# Patient Record
Sex: FEMALE | Race: BLACK OR AFRICAN AMERICAN | NOT HISPANIC OR LATINO | Employment: FULL TIME | URBAN - METROPOLITAN AREA
[De-identification: names, ages, dates, MRNs, and addresses within clinical notes are randomized per-mention and may not be internally consistent; named-entity substitution may affect disease eponyms.]

---

## 2017-01-26 ENCOUNTER — TELEPHONE (OUTPATIENT)
Dept: FAMILY MEDICINE | Facility: CLINIC | Age: 52
End: 2017-01-26

## 2017-02-13 DIAGNOSIS — I10 ESSENTIAL HYPERTENSION WITH GOAL BLOOD PRESSURE LESS THAN 140/90: ICD-10-CM

## 2017-02-13 NOTE — TELEPHONE ENCOUNTER
Losartan 50MG Tablets      Last Written Prescription Date: 8/22/16  Last Fill Quantity: 90, # refills: 1  Last Office Visit with [facility], [facility] or [facility] prescribing provider: 2/29/16       Potassium   Date Value Ref Range Status   02/29/2016 4.0 3.4 - 5.3 mmol/L Final     Creatinine   Date Value Ref Range Status   02/29/2016 0.68 0.52 - 1.04 mg/dL Final   09/11/2007 0.60 0.60 - 1.30 mg/dL Final       Amlodipine Besylate 5MG Tablets      Last Written Prescription Date: 8/22/16  Last Fill Quantity: 90, # refills: 1    Last Office Visit with [facility], [facility] or [facility] prescribing provider:  2/29/16   Future Office Visit:        BP Readings from Last 3 Encounters:   08/22/16 142/82   08/26/16 132/80   02/29/16 140/84       BP Readings from Last 3 Encounters:   08/22/16 142/82   08/26/16 132/80   02/29/16 140/84

## 2017-02-14 RX ORDER — LOSARTAN POTASSIUM 50 MG/1
50 TABLET ORAL DAILY
Qty: 30 TABLET | Refills: 0 | Status: SHIPPED | OUTPATIENT
Start: 2017-02-14 | End: 2017-03-21

## 2017-02-14 RX ORDER — AMLODIPINE BESYLATE 5 MG/1
5 TABLET ORAL DAILY
Qty: 30 TABLET | Refills: 0 | Status: SHIPPED | OUTPATIENT
Start: 2017-02-14 | End: 2017-04-24

## 2017-02-14 NOTE — TELEPHONE ENCOUNTER
Medication is being filled for 1 time refill only due to:  Patient's last office visit was 2/29/16.     Dayana Amin RN  [facility]

## 2017-03-07 NOTE — TELEPHONE ENCOUNTER
3/7/2017    Call Regarding Onboarding [employer]    Attempt 2    Message on voicemail     Comments: dep--- spouse  4 child and 1 adult      Outreach   cnt

## 2017-03-21 DIAGNOSIS — I10 ESSENTIAL HYPERTENSION WITH GOAL BLOOD PRESSURE LESS THAN 140/90: ICD-10-CM

## 2017-03-21 NOTE — TELEPHONE ENCOUNTER
Losartan 50mg tabs      Last Written Prescription Date: 2/14/17  Last Fill Quantity: 30, # refills: 0  [place] [facility] [facility] or [facility] prescribing provider: 2/29/16       Potassium   Date Value Ref Range Status   02/29/2016 4.0 3.4 - 5.3 mmol/L Final     Creatinine   Date Value Ref Range Status   02/29/2016 0.68 0.52 - 1.04 mg/dL Final   09/11/2007 0.60 0.60 - 1.30 mg/dL Final     BP Readings from Last 3 Encounters:   08/22/16 142/82   08/26/16 132/80   02/29/16 140/84

## 2017-03-21 NOTE — TELEPHONE ENCOUNTER
Left VM for pt to return call to clinic    Pt needs to be informed she is past due for appt.     Kallie Yun RN

## 2017-03-30 RX ORDER — LOSARTAN POTASSIUM 50 MG/1
50 TABLET ORAL DAILY
Qty: 30 TABLET | Refills: 0 | Status: SHIPPED | OUTPATIENT
Start: 2017-03-30 | End: 2017-04-24

## 2017-03-30 NOTE — TELEPHONE ENCOUNTER
Pt has appointment scheduled for physical with Dr. Alcaraz at the  clinic. Forwarding to  refills for FYI. Pt will need filled at her appointment.    MEEK AlvaresN, RN  [facility]

## 2017-03-30 NOTE — TELEPHONE ENCOUNTER
Routing back to [place].  This patient is not a [facility] patient yet.  She has never been seen at [facility].  Looks like she may run out of meds before seeing [facility].    Maria T Leone RN

## 2017-04-24 ENCOUNTER — OFFICE VISIT (OUTPATIENT)
Dept: FAMILY MEDICINE | Facility: CLINIC | Age: 52
End: 2017-04-24
Payer: COMMERCIAL

## 2017-04-24 VITALS
HEART RATE: 77 BPM | RESPIRATION RATE: 14 BRPM | SYSTOLIC BLOOD PRESSURE: 138 MMHG | OXYGEN SATURATION: 98 % | DIASTOLIC BLOOD PRESSURE: 81 MMHG | TEMPERATURE: 99 F | BODY MASS INDEX: 32.1 KG/M2 | HEIGHT: 67 IN | WEIGHT: 204.5 LBS

## 2017-04-24 DIAGNOSIS — J06.9 UPPER RESPIRATORY TRACT INFECTION, UNSPECIFIED TYPE: ICD-10-CM

## 2017-04-24 DIAGNOSIS — Z13.220 LIPID SCREENING: ICD-10-CM

## 2017-04-24 DIAGNOSIS — E66.9 OBESITY, UNSPECIFIED OBESITY SEVERITY, UNSPECIFIED OBESITY TYPE: ICD-10-CM

## 2017-04-24 DIAGNOSIS — I10 ESSENTIAL HYPERTENSION: ICD-10-CM

## 2017-04-24 DIAGNOSIS — Z00.00 ENCOUNTER FOR ROUTINE ADULT HEALTH EXAMINATION WITHOUT ABNORMAL FINDINGS: Primary | ICD-10-CM

## 2017-04-24 DIAGNOSIS — Z11.59 NEED FOR HEPATITIS C SCREENING TEST: ICD-10-CM

## 2017-04-24 PROCEDURE — 99213 OFFICE O/P EST LOW 20 MIN: CPT | Mod: 25 | Performed by: FAMILY MEDICINE

## 2017-04-24 PROCEDURE — 99396 PREV VISIT EST AGE 40-64: CPT | Performed by: FAMILY MEDICINE

## 2017-04-24 RX ORDER — AMLODIPINE BESYLATE 5 MG/1
5 TABLET ORAL DAILY
Qty: 90 TABLET | Refills: 3 | Status: SHIPPED | OUTPATIENT
Start: 2017-04-24 | End: 2018-05-03

## 2017-04-24 RX ORDER — LOSARTAN POTASSIUM 50 MG/1
50 TABLET ORAL DAILY
Qty: 90 TABLET | Refills: 3 | Status: SHIPPED | OUTPATIENT
Start: 2017-04-24 | End: 2018-05-09

## 2017-04-24 NOTE — MR AVS SNAPSHOT
After Visit Summary   4/24/2017    Aubree Yao    MRN: [id number]           Patient Information     Date Of Birth          1965        Visit Information        Provider Department      4/24/2017 3:20 PM Radha Alcaraz MD [facility] [place]        Today's Diagnoses     Encounter for routine adult health examination without abnormal findings    -  1    Need for hepatitis C screening test        Essential hypertension        Lipid screening        Obesity, unspecified obesity severity, unspecified obesity type          Care Instructions     Try robotussin DM (dextramethorphan) or coricidin for your cough day or night. Try mucinex (guaifenesin) for your congestion day or night. Ibuprofen/aleve can be used in the short term, but can raise your blood pressure a little bit. Tylenol 1000mg three times a day is okay as well for fever or discomfort.    See me in one week.       Preventive Health Recommendations  Female Ages 50 - 64    Yearly exam: See your health care provider every year in order to  o Review health changes.   o Discuss preventive care.    o Review your medicines if your doctor has prescribed any.      Get a Pap test every three years (unless you have an abnormal result and your provider advises testing more often).    If you get Pap tests with HPV test, you only need to test every 5 years, unless you have an abnormal result.     You do not need a Pap test if your uterus was removed (hysterectomy) and you have not had cancer.    You should be tested each year for STDs (sexually transmitted diseases) if you're at risk.     Have a mammogram every 1 to 2 years.    Have a colonoscopy at age 50, or have a yearly FIT test (stool test). These exams screen for colon cancer.      Have a cholesterol test every 5 years, or more often if advised.    Have a diabetes test (fasting glucose) every three years. If you are at risk for diabetes, you should have this test more often.      If you are at risk for osteoporosis (brittle bone disease), think about having a bone density scan (DEXA).    Shots: Get a flu shot each year. Get a tetanus shot every 10 years.    Nutrition:     Eat at least 5 servings of fruits and vegetables each day.    Eat whole-grain bread, whole-wheat pasta and brown rice instead of white grains and rice.    Talk to your provider about Calcium and Vitamin D.     Lifestyle    Exercise at least 150 minutes a week (30 minutes a day, 5 days a week). This will help you control your weight and prevent disease.    Limit alcohol to one drink per day.    No smoking.     Wear sunscreen to prevent skin cancer.     See your dentist every six months for an exam and cleaning.    See your eye doctor every 1 to 2 years.          Follow-ups after your visit        Additional Services     BARIATRIC ADULT REFERRAL       Your provider has referred you to: [facility] [facility] [phone]   [email or web address]  [facility]: Lifestyle Medicine Program for Weight Management [facility] [phone]   [email or web address]  [facility]: [facility] [place] [phone]   [email or web address]    Please be aware that coverage of these services is subject to the terms and limitations of your health insurance plan.  Call member services at your health plan with any benefit or coverage questions.      Please bring the following with you to your appointment:      (1) List of current medications   (2) This referral request   (3) Any documents/labs given to you for this referral                  Future tests that were ordered for you today     Open Future Orders        Priority Expected Expires Ordered    Hepatitis C Screen Reflex to HCV RNA Quant and Genotype Routine  4/24/2018 4/24/2017     "Basic metabolic panel Routine  2018    Lipid panel reflex to direct LDL Routine  2018            Who to contact     If you have questions or need follow up information about today's clinic visit or your schedule please contact [facility] directly at [phone].  Normal or non-critical lab and imaging results will be communicated to you by MyChart, letter or phone within 4 business days after the clinic has received the results. If you do not hear from us within 7 days, please contact the clinic through [employer] or phone. If you have a critical or abnormal lab result, we will notify you by phone as soon as possible.  Submit refill requests through [employer] or call your pharmacy and they will forward the refill request to us. Please allow 3 business days for your refill to be completed.          Additional Information About Your Visit        [employer] Information     [employer] lets you send messages to your doctor, view your test results, renew your prescriptions, schedule appointments and more. To sign up, go to [email or web address] . Click on \"Log in\" on the left side of the screen, which will take you to the Welcome page. Then click on \"Sign up Now\" on the right side of the page.     You will be asked to enter the access code listed below, as well as some personal information. Please follow the directions to create your username and password.     Your access code is: [email or web address]  Expires: 2017  4:05 PM     Your access code will  in 90 days. If you need help or a new code, please call your [place] clinic or [phone].        Care EveryWhere ID     This is your Care EveryWhere ID. This could be used by other organizations to access your [place] medical records  [id number]        Your Vitals Were     Pulse Temperature Respirations Height Last Period Pulse Oximetry    77 99  F (37.2  C) (Oral) 14 5' 7\" (1.702 m) 2017 98%    BMI (Body Mass Index)       "             32.03 kg/m2            Blood Pressure from Last 3 Encounters:   04/24/17 (!) 139/100   08/22/16 142/82   08/26/16 132/80    Weight from Last 3 Encounters:   04/24/17 204 lb 8 oz (92.8 kg)   02/29/16 198 lb (89.8 kg)   08/05/15 196 lb 6.4 oz (89.1 kg)              We Performed the Following     BARIATRIC ADULT REFERRAL          Where to get your medicines      These medications were sent to [facility] [address] [place] [address] [address] [place] [address] [address] [address]     Phone:  [phone]     amLODIPine 5 MG tablet    losartan 50 MG tablet          Primary Care Provider Office Phone # Fax #    Cathryn Craft PA-C [phone]       [facility] [address]   [facility] [address]        Thank you!     Thank you for choosing [facility] [place]  for your care. Our goal is always to provide you with excellent care. Hearing back from our patients is one way we can continue to improve our services. Please take a few minutes to complete the written survey that you may receive in the mail after your visit with us. Thank you!             Your Updated Medication List - Protect others around you: Learn how to safely use, store and throw away your medicines at www.disposemymeds.org.          This list is accurate as of: 4/24/17  4:05 PM.  Always use your most recent med list.                   Brand Name Dispense Instructions for use    amLODIPine 5 MG tablet    NORVASC    90 tablet    Take 1 tablet (5 mg) by mouth daily Please schedule an office visit prior to further refills. [phone]       losartan 50 MG tablet    COZAAR    90 tablet    Take 1 tablet (50 mg) by mouth daily Please schedule an office visit prior to further refills [phone]

## 2017-04-24 NOTE — NURSING NOTE
"Chief Complaint   Patient presents with     Physical       Initial BP (!) 139/100  Pulse 77  Temp 99  F (37.2  C) (Oral)  Resp 14  Ht 5' 7\" (1.702 m)  Wt 204 lb 8 oz (92.8 kg)  LMP 04/03/2017  SpO2 98%  BMI 32.03 kg/m2 Estimated body mass index is 32.03 kg/(m^2) as calculated from the following:    Height as of this encounter: 5' 7\" (1.702 m).    Weight as of this encounter: 204 lb 8 oz (92.8 kg).  Medication Reconciliation: complete     Crystal Timmons MA     "

## 2017-04-24 NOTE — PROGRESS NOTES
SUBJECTIVE:     CC: Aubree Yao is an 51 year old woman who presents for preventive health visit.     Physical   Annual:     Getting at least 3 servings of Calcium per day::  Yes    Bi-annual eye exam::  Yes    Dental care twice a year::  Yes    Sleep apnea or symptoms of sleep apnea::  None    Diet::  Regular (no restrictions)    Frequency of exercise::  1 day/week    Duration of exercise::  Less than 15 minutes    Taking medications regularly::  Yes    Additional concerns today::  No    Cold: Aubree has had cold symptoms since last Tuesday. It seems like the cold is getting worse, especially the pressure on the sides of her face. She has been coughing a lot which has caused her back and stomach to hurt. She has been taking antihistamines, decongestants, which helped relieve pressure. She also took Lady-seltzer day and night.    Today's PHQ-2 Score:   PHQ-2 ( 1999 Pfizer) 4/24/2017   Q1: Little interest or pleasure in doing things -   Q2: Feeling down, depressed or hopeless -   PHQ-2 Score -   Little interest or pleasure in doing things Not at all   Feeling down, depressed or hopeless Not at all   PHQ-2 Score 0     Abuse: Current or Past(Physical, Sexual or Emotional)- No  Do you feel safe in your environment - Yes    Social History   Substance Use Topics     Smoking status: Never Smoker     Smokeless tobacco: Not on file     Alcohol use No     Recent Labs   Lab Test  02/29/16   1036  04/22/13   1003   CHOL  155  177   HDL  36*  36*   LDL  110*  126   TRIG  46  72   CHOLHDLRATIO   --   4.9   NHDL  119   --      Reviewed orders with patient.  Reviewed health maintenance and updated orders accordingly - Yes    Mammo Decision Support:  Patient over age 50, mutual decision to screen reflected in health maintenance.    Pertinent mammograms are reviewed under the imaging tab.    History of abnormal Pap smear:   NO - age 30-65 PAP every 5 years with negative HPV co-testing recommended    Last 3 Pap  Results:   PAP (no units)   Date Value   2014 NIL   2011 NIL       Reviewed and updated as needed this visit by clinical staff  Tobacco  Allergies  Meds  Med Hx  Surg Hx  Fam Hx  Soc Hx        Reviewed and updated as needed this visit by Provider        Past Medical History:   Diagnosis Date     Hypertension       History reviewed. No pertinent surgical history.  Obstetric History     No data available        ROS:  10 point ROS is negative except as noted above.    BP Readings from Last 3 Encounters:   17 (!) 139/100   16 142/82   16 132/80    Wt Readings from Last 3 Encounters:   17 92.8 kg (204 lb 8 oz)   16 89.8 kg (198 lb)   08/05/15 89.1 kg (196 lb 6.4 oz)        Patient Active Problem List   Diagnosis     Essential hypertension     Preeclampsia     S/P      Low back pain     Abnormal mammogram     Non morbid obesity due to excess calories     History reviewed. No pertinent surgical history.    Social History   Substance Use Topics     Smoking status: Never Smoker     Smokeless tobacco: Not on file     Alcohol use No     Family History   Problem Relation Age of Onset     CEREBROVASCULAR DISEASE Mother      from non treated HTN - alive     Hypertension Mother      Didn't manage with meds     Hypertension Paternal Grandfather          Current Outpatient Prescriptions   Medication Sig Dispense Refill     losartan (COZAAR) 50 MG tablet Take 1 tablet (50 mg) by mouth daily Please schedule an office visit prior to further refills [phone] 30 tablet 0     amLODIPine (NORVASC) 5 MG tablet Take 1 tablet (5 mg) by mouth daily Please schedule an office visit prior to further refills. [phone] 30 tablet 0     No Known Allergies  Recent Labs   Lab Test  16   1037  16   1036  10/21/14   0859  14   0930   13   1003   10/10/10   0420   LDL   --   110*   --    --    --   126   --    --    HDL   --   36*   --    --    --   36*   --    --   "  TRIG   --   46   --    --    --   72   --    --    ALT  20   --    --    --    --   25   --   13   CR  0.68   --   0.9  0.77   < >  0.83   < >  0.80   GFRESTIMATED  >90  Non  GFR Calc     --    --   79   < >  74   < >  78   GFRESTBLACK  >90   GFR Calc     --    --   >90   GFR Calc     < >  89   < >  >90   POTASSIUM  4.0   --   4.1  3.2*   < >  3.9   < >  3.1*   TSH   --   0.61   --   0.70   --    --    < >   --     < > = values in this interval not displayed.      This document serves as a record of the services and decisions personally performed and made by Radha Alcaraz MD. It was created on her behalf by Melody Davis, a trained medical scribe. The creation of this document is based on the provider's statements to the medical scribe.  Melody Davis April 24, 2017 3:41 PM    OBJECTIVE:     BP (!) 139/100  Pulse 77  Temp 99  F (37.2  C) (Oral)  Resp 14  Ht 1.702 m (5' 7\")  Wt 92.8 kg (204 lb 8 oz)  LMP 04/03/2017  SpO2 98%  BMI 32.03 kg/m2  EXAM:  GENERAL: healthy, alert and no distress  EYES: Eyes grossly normal to inspection, PERRL and conjunctivae and sclerae normal  HENT: ear canals and TM's normal, nose and mouth without ulcers or lesions  NECK: no adenopathy, no asymmetry, masses, or scars and thyroid normal to palpation  RESP: lungs clear to auscultation - no rales, rhonchi or wheezes  CV: sounds mildly tachy on exam, with occasional irregular beats, normal S1 S2, no S3 or S4, no murmur, click or rub  ABDOMEN: soft, nontender, no hepatosplenomegaly, no masses and bowel sounds normal  MS: no gross musculoskeletal defects noted, no edema  SKIN: no suspicious lesions or rashes  NEURO: Normal strength and tone, mentation intact and speech normal  PSYCH: mentation appears normal, affect normal/bright  LYMPH: no cervical, supraclavicular, axillary, or inguinal adenopathy    ASSESSMENT/PLAN:     1. Encounter for routine adult health examination without " abnormal findings  Last colonoscopy 7/2016. Last mammogram 11/2016.    2. Need for hepatitis C screening test  - Hepatitis C Screen Reflex to HCV RNA Quant and Genotype; Future    3. Essential hypertension  Uncontrolled today, likely secondary to decongestant use. She will follow up with me in one week.  - losartan (COZAAR) 50 MG tablet; Take 1 tablet (50 mg) by mouth daily Please schedule an office visit prior to further refills [phone]  Dispense: 90 tablet; Refill: 3  - amLODIPine (NORVASC) 5 MG tablet; Take 1 tablet (5 mg) by mouth daily Please schedule an office visit prior to further refills. [phone]  Dispense: 90 tablet; Refill: 3  - Basic metabolic panel; Future    4. Lipid screening  Not fasting today. Will check at next visit.  - Lipid panel reflex to direct LDL; Future    5. Obesity, unspecified obesity severity, unspecified obesity type  Looking for suggestions outside of diet and exercise changes on her own.  - BARIATRIC ADULT REFERRAL    6. URI  See pt instructions. Follow up in one week.    Patient Instructions    Try robotussin DM (dextramethorphan) or coricidin for your cough day or night. Try mucinex (guaifenesin) for your congestion day or night. Ibuprofen/aleve can be used in the short term, but can raise your blood pressure a little bit. Tylenol 1000mg three times a day is okay as well for fever or discomfort.    See me in one week.       Preventive Health Recommendations  Female Ages 50 - 64    Yearly exam: See your health care provider every year in order to  o Review health changes.   o Discuss preventive care.    o Review your medicines if your doctor has prescribed any.      Get a Pap test every three years (unless you have an abnormal result and your provider advises testing more often).    If you get Pap tests with HPV test, you only need to test every 5 years, unless you have an abnormal result.     You do not need a Pap test if your uterus was removed (hysterectomy) and you  "have not had cancer.    You should be tested each year for STDs (sexually transmitted diseases) if you're at risk.     Have a mammogram every 1 to 2 years.    Have a colonoscopy at age 50, or have a yearly FIT test (stool test). These exams screen for colon cancer.      Have a cholesterol test every 5 years, or more often if advised.    Have a diabetes test (fasting glucose) every three years. If you are at risk for diabetes, you should have this test more often.     If you are at risk for osteoporosis (brittle bone disease), think about having a bone density scan (DEXA).    Shots: Get a flu shot each year. Get a tetanus shot every 10 years.    Nutrition:     Eat at least 5 servings of fruits and vegetables each day.    Eat whole-grain bread, whole-wheat pasta and brown rice instead of white grains and rice.    Talk to your provider about Calcium and Vitamin D.     Lifestyle    Exercise at least 150 minutes a week (30 minutes a day, 5 days a week). This will help you control your weight and prevent disease.    Limit alcohol to one drink per day.    No smoking.     Wear sunscreen to prevent skin cancer.     See your dentist every six months for an exam and cleaning.    See your eye doctor every 1 to 2 years.        COUNSELING:  Reviewed preventive health counseling, as reflected in patient instructions    BP Screening:   Last 3 BP Readings:    BP Readings from Last 3 Encounters:   04/24/17 (!) 139/100   08/22/16 142/82   08/26/16 132/80     The following was recommended to the patient:  Re-screen within 4 weeks and recommend lifestyle modifications     reports that she has never smoked. She does not have any smokeless tobacco history on file.    Estimated body mass index is 32.03 kg/(m^2) as calculated from the following:    Height as of this encounter: 1.702 m (5' 7\").    Weight as of this encounter: 92.8 kg (204 lb 8 oz).   Weight management plan: Patient referred to endocrine and/or weight management " specialty    Counseling Resources:  ATP IV Guidelines  Pooled Cohorts Equation Calculator  Breast Cancer Risk Calculator  FRAX Risk Assessment  ICSI Preventive Guidelines  Dietary Guidelines for Americans, 2010  USDA's MyPlate  ASA Prophylaxis  Lung CA Screening    The information in this document, created by the medical scribe for me, accurately reflects the services I personally performed and the decisions made by me. I have reviewed and approved this document for accuracy prior to leaving the patient care area.  4/24/2017 3:40 PM      Radha Alcaraz MD, MD  [facility]  Answers for HPI/ROS submitted by the patient on 4/24/2017   Q1: Little interest or pleasure in doing things: 0=Not at all  Q2: Feeling down, depressed or hopeless: 0=Not at all  PHQ-2 Score: 0

## 2017-04-24 NOTE — PATIENT INSTRUCTIONS
Try robotussin DM (dextramethorphan) or coricidin for your cough day or night. Try mucinex (guaifenesin) for your congestion day or night. Ibuprofen/aleve can be used in the short term, but can raise your blood pressure a little bit. Tylenol 1000mg three times a day is okay as well for fever or discomfort.    See me in one week.       Preventive Health Recommendations  Female Ages 50 - 64    Yearly exam: See your health care provider every year in order to  o Review health changes.   o Discuss preventive care.    o Review your medicines if your doctor has prescribed any.      Get a Pap test every three years (unless you have an abnormal result and your provider advises testing more often).    If you get Pap tests with HPV test, you only need to test every 5 years, unless you have an abnormal result.     You do not need a Pap test if your uterus was removed (hysterectomy) and you have not had cancer.    You should be tested each year for STDs (sexually transmitted diseases) if you're at risk.     Have a mammogram every 1 to 2 years.    Have a colonoscopy at age 50, or have a yearly FIT test (stool test). These exams screen for colon cancer.      Have a cholesterol test every 5 years, or more often if advised.    Have a diabetes test (fasting glucose) every three years. If you are at risk for diabetes, you should have this test more often.     If you are at risk for osteoporosis (brittle bone disease), think about having a bone density scan (DEXA).    Shots: Get a flu shot each year. Get a tetanus shot every 10 years.    Nutrition:     Eat at least 5 servings of fruits and vegetables each day.    Eat whole-grain bread, whole-wheat pasta and brown rice instead of white grains and rice.    Talk to your provider about Calcium and Vitamin D.     Lifestyle    Exercise at least 150 minutes a week (30 minutes a day, 5 days a week). This will help you control your weight and prevent disease.    Limit alcohol to one drink per  day.    No smoking.     Wear sunscreen to prevent skin cancer.     See your dentist every six months for an exam and cleaning.    See your eye doctor every 1 to 2 years.

## 2017-05-02 DIAGNOSIS — Z11.59 NEED FOR HEPATITIS C SCREENING TEST: ICD-10-CM

## 2017-05-02 DIAGNOSIS — Z13.220 LIPID SCREENING: ICD-10-CM

## 2017-05-02 DIAGNOSIS — I10 ESSENTIAL HYPERTENSION: ICD-10-CM

## 2017-05-02 PROCEDURE — 86803 HEPATITIS C AB TEST: CPT | Performed by: FAMILY MEDICINE

## 2017-05-02 PROCEDURE — 36415 COLL VENOUS BLD VENIPUNCTURE: CPT | Performed by: FAMILY MEDICINE

## 2017-05-02 PROCEDURE — 80061 LIPID PANEL: CPT | Performed by: FAMILY MEDICINE

## 2017-05-02 PROCEDURE — 80048 BASIC METABOLIC PNL TOTAL CA: CPT | Performed by: FAMILY MEDICINE

## 2017-05-02 NOTE — LETTER
[facility]   [address] [place] [address]   [id number]  [phone]    May 9, 2017      Aubree Mirlande Wang Maximilian  [address] [facility] [address]              Dear Ms. Felipe Yao,      The results of your recent lipid (cholesterol) profile were stable.    Here are the results:  Lab Results       Component                Value               Date                       CHOL                     151                 05/02/2017            Lab Results       Component                Value               Date                       HDL                      31                  05/02/2017            Lab Results       Component                Value               Date                       LDL                      104                 05/02/2017            Lab Results       Component                Value               Date                       TRIG                     81                  05/02/2017            Lab Results       Component                Value               Date                       CHOLHDLRATIO             4.9                 04/22/2013              Desired or goal levels are:  CHOLESTEROL: Desirable is less than 200.   HDL (Good Cholesterol): Desirable is greater than 40 (for men) greater than 50 (for women).  LDL (Bad Cholesterol): Desirable is less than 130 (or less than 100 if you have heart disease or diabetes). Borderline 130-160.  TRIGLYCERIDES: Desirable is less than 150.  Borderline is 150-200.    Your HDL (the good cholesterol) level is low, no medication is required at this point. Reducing your total fat intake, increasing exercise level, reducing weight, adding olive oil to your diet, etc, may help to increase this level..    As you may know, an elevated cholesterol is one factor that increases your risk for heart disease and stroke. You can improve your cholesterol by controlling the amount and type of fat you eat and by increasing your daily activity level.    Here  are some ways to improve your nutrition:  Eat less fat (especially butter, Crisco and other saturated fats)  Buy lean cuts of meat, reduce your portions of red meat or substitute poultry or fish  Use skim milk and low-fat dairy products  Eat no more than 4 egg yolks per week  Avoid fried or fast foods that are high in fat  Eat more fruits and vegetables      Also consider starting or increasing your aerobic activity. Aerobic activity is the best way to improve HDL (good) cholesterol. If this would be new to you, please talk with me first about what activities are safe for you.      Other lab results:    Your hepatitis C test was normal.    The testing of your blood sugar, kidney function, and electrolytes was normal.       Please feel free to contact us with any questions or if you would like more information.    Results for orders placed or performed in visit on 05/02/17   Hepatitis C Screen Reflex to HCV RNA Quant and Genotype   Result Value Ref Range    Hepatitis C Antibody  NR     Nonreactive   Assay performance characteristics have not been established for newborns,   infants, and children     Basic metabolic panel   Result Value Ref Range    Sodium 140 133 - 144 mmol/L    Potassium 3.9 3.4 - 5.3 mmol/L    Chloride 106 94 - 109 mmol/L    Carbon Dioxide 23 20 - 32 mmol/L    Anion Gap 11 3 - 14 mmol/L    Glucose 76 70 - 99 mg/dL    Urea Nitrogen 10 7 - 30 mg/dL    Creatinine 0.88 0.52 - 1.04 mg/dL    GFR Estimate 68 >60 mL/min/1.7m2    GFR Estimate If Black 82 >60 mL/min/1.7m2    Calcium 9.2 8.5 - 10.1 mg/dL   Lipid panel reflex to direct LDL   Result Value Ref Range    Cholesterol 151 <200 mg/dL    Triglycerides 81 <150 mg/dL    HDL Cholesterol 31 (L) >49 mg/dL    LDL Cholesterol Calculated 104 (H) <100 mg/dL    Non HDL Cholesterol 120 <130 mg/dL         Sincerely,    Radha Alcaraz MD/nr

## 2017-05-03 LAB
ANION GAP SERPL CALCULATED.3IONS-SCNC: 11 MMOL/L (ref 3–14)
BUN SERPL-MCNC: 10 MG/DL (ref 7–30)
CALCIUM SERPL-MCNC: 9.2 MG/DL (ref 8.5–10.1)
CHLORIDE SERPL-SCNC: 106 MMOL/L (ref 94–109)
CHOLEST SERPL-MCNC: 151 MG/DL
CO2 SERPL-SCNC: 23 MMOL/L (ref 20–32)
CREAT SERPL-MCNC: 0.88 MG/DL (ref 0.52–1.04)
GFR SERPL CREATININE-BSD FRML MDRD: 68 ML/MIN/1.7M2
GLUCOSE SERPL-MCNC: 76 MG/DL (ref 70–99)
HCV AB SERPL QL IA: NORMAL
HDLC SERPL-MCNC: 31 MG/DL
LDLC SERPL CALC-MCNC: 104 MG/DL
NONHDLC SERPL-MCNC: 120 MG/DL
POTASSIUM SERPL-SCNC: 3.9 MMOL/L (ref 3.4–5.3)
SODIUM SERPL-SCNC: 140 MMOL/L (ref 133–144)
TRIGL SERPL-MCNC: 81 MG/DL

## 2017-05-08 NOTE — PROGRESS NOTES
The results of your recent lipid (cholesterol) profile were stable.    Here are the results:  Lab Results       Component                Value               Date                       CHOL                     151                 05/02/2017            Lab Results       Component                Value               Date                       HDL                      31                  05/02/2017            Lab Results       Component                Value               Date                       LDL                      104                 05/02/2017            Lab Results       Component                Value               Date                       TRIG                     81                  05/02/2017            Lab Results       Component                Value               Date                       CHOLHDLRTAMMIO             4.9                 04/22/2013              Desired or goal levels are:  CHOLESTEROL: Desirable is less than 200.   HDL (Good Cholesterol): Desirable is greater than 40 (for men) greater than 50 (for women).  LDL (Bad Cholesterol): Desirable is less than 130 (or less than 100 if you have heart disease or diabetes). Borderline 130-160.  TRIGLYCERIDES: Desirable is less than 150.  Borderline is 150-200.    Your HDL (the good cholesterol) level is low, no medication is required at this point. Reducing your total fat intake, increasing exercise level, reducing weight, adding olive oil to your diet, etc, may help to increase this level..    As you may know, an elevated cholesterol is one factor that increases your risk for heart disease and stroke. You can improve your cholesterol by controlling the amount and type of fat you eat and by increasing your daily activity level.    Here are some ways to improve your nutrition:  Eat less fat (especially butter, Crisco and other saturated fats)  Buy lean cuts of meat, reduce your portions of red meat or substitute poultry or fish  Use skim milk and low-fat  dairy products  Eat no more than 4 egg yolks per week  Avoid fried or fast foods that are high in fat  Eat more fruits and vegetables      Also consider starting or increasing your aerobic activity. Aerobic activity is the best way to improve HDL (good) cholesterol. If this would be new to you, please talk with me first about what activities are safe for you.      Other lab results:    Your hepatitis C test was normal.    The testing of your blood sugar, kidney function, and electrolytes was normal.       Please feel free to contact us with any questions or if you would like more information.

## 2017-11-13 ENCOUNTER — RADIANT APPOINTMENT (OUTPATIENT)
Dept: MAMMOGRAPHY | Facility: CLINIC | Age: 52
End: 2017-11-13
Attending: PHYSICIAN ASSISTANT
Payer: COMMERCIAL

## 2017-11-13 DIAGNOSIS — Z12.31 VISIT FOR SCREENING MAMMOGRAM: ICD-10-CM

## 2017-11-13 PROCEDURE — G0202 SCR MAMMO BI INCL CAD: HCPCS

## 2017-11-24 ENCOUNTER — ALLIED HEALTH/NURSE VISIT (OUTPATIENT)
Dept: NURSING | Facility: CLINIC | Age: 52
End: 2017-11-24
Payer: COMMERCIAL

## 2017-11-24 DIAGNOSIS — Z23 NEED FOR PROPHYLACTIC VACCINATION AND INOCULATION AGAINST INFLUENZA: Primary | ICD-10-CM

## 2017-11-24 PROCEDURE — 90686 IIV4 VACC NO PRSV 0.5 ML IM: CPT

## 2017-11-24 PROCEDURE — 99207 ZZC NO CHARGE NURSE ONLY: CPT

## 2017-11-24 PROCEDURE — 90471 IMMUNIZATION ADMIN: CPT

## 2017-11-24 NOTE — MR AVS SNAPSHOT
"              After Visit Summary   11/24/2017    Aubree Yao    MRN: [id number]           Patient Information     Date Of Birth          1965        Visit Information        Provider Department      11/24/2017 10:15 AM  FLU CLINIC NURSE [facility]        Today's Diagnoses     Need for prophylactic vaccination and inoculation against influenza    -  1       Follow-ups after your visit        Your next 10 appointments already scheduled     Nov 24, 2017 10:15 AM CST   Nurse Only with  FLU CLINIC NURSE   [facility] ([facility])    [address] [place] [address] [phone]              Who to contact     If you have questions or need follow up information about today's clinic visit or your schedule please contact [facility] directly at [phone].  Normal or non-critical lab and imaging results will be communicated to you by MOgenehart, letter or phone within 4 business days after the clinic has received the results. If you do not hear from us within 7 days, please contact the clinic through MOgenehart or phone. If you have a critical or abnormal lab result, we will notify you by phone as soon as possible.  Submit refill requests through Duetto or call your pharmacy and they will forward the refill request to us. Please allow 3 business days for your refill to be completed.          Additional Information About Your Visit        MyChart Information     Duetto lets you send messages to your doctor, view your test results, renew your prescriptions, schedule appointments and more. To sign up, go to [email or web address] . Click on \"Log in\" on the left side of the screen, which will take you to the Welcome page. Then click on \"Sign up Now\" on the right side of the page.     You will be asked to enter the access code listed below, as well as some personal information. Please follow the directions to create your " username and password.     Your access code is: [email or web address]  Expires: 2018  9:10 AM     Your access code will  in 90 days. If you need help or a new code, please call your [place] clinic or [phone].        Care EveryWhere ID     This is your Care EveryWhere ID. This could be used by other organizations to access your [place] medical records  [id number]         Blood Pressure from Last 3 Encounters:   17 138/81   16 142/82   16 132/80    Weight from Last 3 Encounters:   17 204 lb 8 oz (92.8 kg)   16 198 lb (89.8 kg)   08/05/15 196 lb 6.4 oz (89.1 kg)              We Performed the Following     FLU VAC, SPLIT VIRUS IM > 3 YO (QUADRIVALENT) [82763]     Vaccine Administration, Initial [22217]        Primary Care Provider Office Phone # Fax #    Cathryn Craft PA-C [phone]       [address] [place] [address]        Equal Access to Services     [facility]: Hadii aad ku hadasho Sotianali, waaxda luqadaha, qaybta kaalmada adeegyada, prince barnett . So [facility] [phone].    ATENCIÓN: Si habla español, tiene a perez disposición servicios gratuitos de asistencia lingüística. Llame al [phone].    We comply with applicable federal civil rights laws and [place] laws. We do not discriminate on the basis of race, color, national origin, age, disability, sex, sexual orientation, or gender identity.            Thank you!     Thank you for choosing [facility]  for your care. Our goal is always to provide you with excellent care. Hearing back from our patients is one way we can continue to improve our services. Please take a few minutes to complete the written survey that you may receive in the mail after your visit with us. Thank you!             Your Updated Medication List - Protect others around you: Learn how to safely use, store and throw away your medicines at www.disposemymeds.org.           This list is accurate as of: 11/24/17  9:10 AM.  Always use your most recent med list.                   Brand Name Dispense Instructions for use Diagnosis    amLODIPine 5 MG tablet    NORVASC    90 tablet    Take 1 tablet (5 mg) by mouth daily Please schedule an office visit prior to further refills. [phone]    Essential hypertension       losartan 50 MG tablet    COZAAR    90 tablet    Take 1 tablet (50 mg) by mouth daily Please schedule an office visit prior to further refills [phone]    Essential hypertension

## 2017-11-24 NOTE — PROGRESS NOTES

## 2018-03-20 ENCOUNTER — TELEPHONE (OUTPATIENT)
Dept: OTHER | Facility: CLINIC | Age: 53
End: 2018-03-20

## 2018-03-20 NOTE — TELEPHONE ENCOUNTER
3/20/2018    Call Regarding Onboarding [employer] plus other    Attempt 1    Message on voicemail    Comments:       Outreach   [facility]

## 2018-04-09 NOTE — TELEPHONE ENCOUNTER
4/9/2018    Call Regarding Onboarding [employer] [place] [employer] Other    Attempt 2    Message on voicemail     Comments: spouse, 4 child dep      Outreach   [facility]

## 2018-04-26 NOTE — TELEPHONE ENCOUNTER
4/26/2018    Call Regarding Onboarding [employer]    Attempt 3    Message     Comments: patient phone was having some difficulties she couldn't hear me.      Outreach   [facility]

## 2018-05-03 DIAGNOSIS — I10 ESSENTIAL HYPERTENSION: ICD-10-CM

## 2018-05-04 NOTE — TELEPHONE ENCOUNTER
"Requested Prescriptions   Pending Prescriptions Disp Refills     amLODIPine (NORVASC) 5 MG tablet [Pharmacy Med Name: AMLODIPINE BESYLATE 5MG TABLETS]  Last Written Prescription Date:  4/24/2017  Last Fill Quantity: 90 tabs,  # refills: 3   Last office visit: 4/24/2017 with prescribing provider:  House   Future Office Visit:     90 tablet 0     Sig: TAKE 1 TABLET BY MOUTH DAILY.    Calcium Channel Blockers Protocol  Failed    5/3/2018  7:29 PM       Failed - Blood pressure under 140/90 in past 12 months    BP Readings from Last 3 Encounters:   04/24/17 138/81   08/22/16 142/82   08/26/16 132/80                Failed - Recent (12 mo) or future (30 days) visit within the authorizing provider's specialty    Patient had office visit in the last 12 months or has a visit in the next 30 days with authorizing provider or within the authorizing provider's specialty.  See \"Patient Info\" tab in inbasket, or \"Choose Columns\" in Meds & Orders section of the refill encounter.           Failed - Normal serum creatinine on file in past 12 months    Recent Labs   Lab Test  05/02/17   1526   CR  0.88            Passed - Patient is age 18 or older       Passed - No active pregnancy on record       Passed - No positive pregnancy test in past 12 months          "

## 2018-05-08 RX ORDER — AMLODIPINE BESYLATE 5 MG/1
TABLET ORAL
Qty: 30 TABLET | Refills: 0 | Status: SHIPPED | OUTPATIENT
Start: 2018-05-08 | End: 2018-06-22

## 2018-05-08 NOTE — TELEPHONE ENCOUNTER
Please call patient to schedule an office visit.  Patient has not been seen in clinic for greater than 1 year.  1 month refill sent.  Kavita Monge RN

## 2018-06-22 ENCOUNTER — OFFICE VISIT (OUTPATIENT)
Dept: FAMILY MEDICINE | Facility: CLINIC | Age: 53
End: 2018-06-22
Payer: COMMERCIAL

## 2018-06-22 VITALS
TEMPERATURE: 97.3 F | RESPIRATION RATE: 14 BRPM | OXYGEN SATURATION: 96 % | DIASTOLIC BLOOD PRESSURE: 116 MMHG | HEART RATE: 76 BPM | HEIGHT: 68 IN | SYSTOLIC BLOOD PRESSURE: 158 MMHG | WEIGHT: 209 LBS | BODY MASS INDEX: 31.67 KG/M2

## 2018-06-22 DIAGNOSIS — E78.5 HYPERLIPIDEMIA, UNSPECIFIED HYPERLIPIDEMIA TYPE: ICD-10-CM

## 2018-06-22 DIAGNOSIS — Z00.00 ROUTINE GENERAL MEDICAL EXAMINATION AT A HEALTH CARE FACILITY: Primary | ICD-10-CM

## 2018-06-22 DIAGNOSIS — I10 ESSENTIAL HYPERTENSION: ICD-10-CM

## 2018-06-22 PROCEDURE — 36415 COLL VENOUS BLD VENIPUNCTURE: CPT | Performed by: FAMILY MEDICINE

## 2018-06-22 PROCEDURE — 80048 BASIC METABOLIC PNL TOTAL CA: CPT | Performed by: FAMILY MEDICINE

## 2018-06-22 PROCEDURE — 99213 OFFICE O/P EST LOW 20 MIN: CPT | Mod: 25 | Performed by: FAMILY MEDICINE

## 2018-06-22 PROCEDURE — 82043 UR ALBUMIN QUANTITATIVE: CPT | Performed by: FAMILY MEDICINE

## 2018-06-22 PROCEDURE — 80061 LIPID PANEL: CPT | Performed by: FAMILY MEDICINE

## 2018-06-22 PROCEDURE — 99396 PREV VISIT EST AGE 40-64: CPT | Performed by: FAMILY MEDICINE

## 2018-06-22 RX ORDER — LOSARTAN POTASSIUM 50 MG/1
TABLET ORAL
Qty: 90 TABLET | Refills: 0 | Status: SHIPPED | OUTPATIENT
Start: 2018-06-22 | End: 2018-08-17

## 2018-06-22 RX ORDER — AMLODIPINE BESYLATE 5 MG/1
TABLET ORAL
Qty: 90 TABLET | Refills: 0 | Status: SHIPPED | OUTPATIENT
Start: 2018-06-22 | End: 2018-08-17

## 2018-06-22 NOTE — MR AVS SNAPSHOT
After Visit Summary   6/22/2018    Aubree Yao    MRN: [id number]           Patient Information     Date Of Birth          1965        Visit Information        Provider Department      6/22/2018 4:00 PM Raymundo Centeno MD [facility]        Today's Diagnoses     Routine general medical examination at a health care facility    -  1    Essential hypertension        Hyperlipidemia, unspecified hyperlipidemia type          Care Instructions      Preventive Health Recommendations  Female Ages 50 - 64    Yearly exam: See your health care provider every year in order to  o Review health changes.   o Discuss preventive care.    o Review your medicines if your doctor has prescribed any.      Get a Pap test every three years (unless you have an abnormal result and your provider advises testing more often).    If you get Pap tests with HPV test, you only need to test every 5 years, unless you have an abnormal result.     You do not need a Pap test if your uterus was removed (hysterectomy) and you have not had cancer.    You should be tested each year for STDs (sexually transmitted diseases) if you're at risk.     Have a mammogram every 1 to 2 years.    Have a colonoscopy at age 50, or have a yearly FIT test (stool test). These exams screen for colon cancer.      Have a cholesterol test every 5 years, or more often if advised.    Have a diabetes test (fasting glucose) every three years. If you are at risk for diabetes, you should have this test more often.     If you are at risk for osteoporosis (brittle bone disease), think about having a bone density scan (DEXA).    Shots: Get a flu shot each year. Get a tetanus shot every 10 years.    Nutrition:     Eat at least 5 servings of fruits and vegetables each day.    Eat whole-grain bread, whole-wheat pasta and brown rice instead of white grains and rice.    Talk to your provider about Calcium and Vitamin D.  "    Lifestyle    Exercise at least 150 minutes a week (30 minutes a day, 5 days a week). This will help you control your weight and prevent disease.    Limit alcohol to one drink per day.    No smoking.     Wear sunscreen to prevent skin cancer.     See your dentist every six months for an exam and cleaning.    See your eye doctor every 1 to 2 years.            Follow-ups after your visit        Your next 10 appointments already scheduled     Jul 23, 2018  5:00 PM CDT   Nurse Only with HW MEDICAL ASSISTANT   [facility] ([facility])    [address] [place] [address] [phone]              Who to contact     If you have questions or need follow up information about today's clinic visit or your schedule please contact [facility] directly at [phone].  Normal or non-critical lab and imaging results will be communicated to you by MyChart, letter or phone within 4 business days after the clinic has received the results. If you do not hear from us within 7 days, please contact the clinic through MyChart or phone. If you have a critical or abnormal lab result, we will notify you by phone as soon as possible.  Submit refill requests through Indochino or call your pharmacy and they will forward the refill request to us. Please allow 3 business days for your refill to be completed.          Additional Information About Your Visit        Care EveryWhere ID     This is your Care EveryWhere ID. This could be used by other organizations to access your [facility] medical records  [id number]        Your Vitals Were     Pulse Temperature Respirations Height Last Period Pulse Oximetry    76 97.3  F (36.3  C) (Tympanic) 14 5' 8\" (1.727 m) 06/14/2018 96%    BMI (Body Mass Index)                   31.78 kg/m2            Blood Pressure from Last 3 Encounters:   06/22/18 (!) 158/116   04/24/17 138/81   08/22/16 142/82    Weight from Last 3 Encounters:   06/22/18 209 lb " (94.8 kg)   04/24/17 204 lb 8 oz (92.8 kg)   02/29/16 198 lb (89.8 kg)              We Performed the Following     Albumin Random Urine Quantitative with Creat Ratio     Basic metabolic panel     Lipid Profile (Chol, Trig, HDL, LDL calc)     OFFICE/OUTPT VISIT,EST,LEVL III          Where to get your medicines      These medications were sent to [facility] [address] [place] [address] AT [facility] [address] [address]     Phone:  [phone]     amLODIPine 5 MG tablet    losartan 50 MG tablet          Primary Care Provider Office Phone # Fax #    Cathryn Craft PA-C [phone]       [address] [address]        Equal Access to Services     LON LIZMAA : Hadii marissa trotter hadasho Soomaali, waaxda luqadaha, qaybta kaalmada adeegyada, prince barnett . So [facility] [phone].    ATENCIÓN: Si habla español, tiene a perez disposición servicios gratuitos de asistencia lingüística. Albin al [phone].    We comply with applicable federal civil rights laws and [place] laws. We do not discriminate on the basis of race, color, national origin, age, disability, sex, sexual orientation, or gender identity.            Thank you!     Thank you for choosing [facility]  for your care. Our goal is always to provide you with excellent care. Hearing back from our patients is one way we can continue to improve our services. Please take a few minutes to complete the written survey that you may receive in the mail after your visit with us. Thank you!             Your Updated Medication List - Protect others around you: Learn how to safely use, store and throw away your medicines at www.disposemymeds.org.          This list is accurate as of 6/22/18 11:59 PM.  Always use your most recent med list.                   Brand Name Dispense Instructions for use Diagnosis    amLODIPine 5 MG  tablet    NORVASC    90 tablet    TAKE 1 TABLET BY MOUTH DAILY.    Essential hypertension       losartan 50 MG tablet    COZAAR    90 tablet    TAKE 1 TABLET BY MOUTH DAILY.    Essential hypertension

## 2018-06-22 NOTE — LETTER
June 27, 2018      Aubree Mirlande Yao  [address] [facility] [address]        Dear Ms.Glenn Yao,    We are writing to inform you of your test results.    Here are your results for your recent labs.     Your labs show that you have a protein called albumin in your urine. What this means is that when your kidneys are working normally, they prevent protein from leaking into the urine. You are already on the correct medication for this which is your Losartan. This medication helps decrease the amount of protein in the urine and can prevent or slow the progression of kidney disease. We will continue to monitor your labs yearly.    Based on your cholesterol results, you are a candidate for cholesterol lowering medication. Can you please follow up in clinic to further discuss medication options.   You can also improve your cholesterol by controlling the amount and type of fat you eat and by increasing your daily activity level.    Here are some ways to improve your nutrition:  Eat less fat (especially butter, Crisco and other saturated fats)  Buy lean cuts of meat; reduce your portions of red meat or substitute poultry or fish  Use skim milk and low-fat dairy products  Eat no more than 4 egg yolks per week  Avoid fried or fast foods that are high in fat  Eat more fruits and vegetables    Also consider starting or increasing your aerobic activity; this is the best way to improve HDL (good) cholesterol. If aerobic activity would be new to you, please talk with me first about what activities are safe for you.    Please call or message me if you have questions or concerns.     Resulted Orders   Basic metabolic panel   Result Value Ref Range    Sodium 140 133 - 144 mmol/L    Potassium 3.7 3.4 - 5.3 mmol/L    Chloride 106 94 - 109 mmol/L    Carbon Dioxide 23 20 - 32 mmol/L    Anion Gap 11 3 - 14 mmol/L    Glucose 81 70 - 99 mg/dL      Comment:      Non Fasting    Urea Nitrogen 10 7 - 30 mg/dL     Creatinine 0.83 0.52 - 1.04 mg/dL    GFR Estimate 72 >60 mL/min/1.7m2      Comment:      Non  GFR Calc    GFR Estimate If Black 87 >60 mL/min/1.7m2      Comment:       GFR Calc    Calcium 9.5 8.5 - 10.1 mg/dL   Albumin Random Urine Quantitative with Creat Ratio   Result Value Ref Range    Creatinine Urine 240 mg/dL    Albumin Urine mg/L 1380 mg/L    Albumin Urine mg/g Cr 575.00 (H) 0 - 25 mg/g Cr   Lipid Profile (Chol, Trig, HDL, LDL calc)   Result Value Ref Range    Cholesterol 163 <200 mg/dL    Triglycerides 92 <150 mg/dL      Comment:      Non Fasting    HDL Cholesterol 32 (L) >49 mg/dL    LDL Cholesterol Calculated 113 (H) <100 mg/dL      Comment:      Above desirable:  100-129 mg/dl  Borderline High:  130-159 mg/dL  High:             160-189 mg/dL  Very high:       >189 mg/dl      Non HDL Cholesterol 131 (H) <130 mg/dL      Comment:      Above Desirable:  130-159 mg/dl  Borderline high:  160-189 mg/dl  High:             190-219 mg/dl  Very high:       >219 mg/dl         If you have any questions or concerns, please call the clinic at the number listed above.       Sincerely,        Raymundo Centeno MD/nr

## 2018-06-22 NOTE — PROGRESS NOTES
SUBJECTIVE:   CC: Aubree Yao is an 52 year old woman who presents for preventive health visit.     Physical   Annual:     Getting at least 3 servings of Calcium per day::  Yes    Bi-annual eye exam::  Yes    Dental care twice a year::  Yes    Sleep apnea or symptoms of sleep apnea::  None    Frequency of exercise::  1 day/week    Duration of exercise::  15-30 minutes    Taking medications regularly::  Yes    Medication side effects::  None    Additional concerns today::  No            Pt ran out of her Norvasc last week. She saved one and took one today. She has still been taking Losartan 50 mg.     Today's PHQ-2 Score:   PHQ-2 ( 1999 Pfizer) 6/22/2018   Q1: Little interest or pleasure in doing things 0   Q2: Feeling down, depressed or hopeless 0   PHQ-2 Score 0   Q1: Little interest or pleasure in doing things Not at all   Q2: Feeling down, depressed or hopeless Not at all   PHQ-2 Score 0       Abuse: Current or Past(Physical, Sexual or Emotional)- No  Do you feel safe in your environment - Yes    Social History   Substance Use Topics     Smoking status: Never Smoker     Smokeless tobacco: Not on file     Alcohol use No     Alcohol Use 6/22/2018   If you drink alcohol do you typically have greater than 3 drinks per day OR greater than 7 drinks per week? Not Applicable   No flowsheet data found.    Reviewed orders with patient.  Reviewed health maintenance and updated orders accordingly - Yes    Patient over age 50, mutual decision to screen reflected in health maintenance.    Pertinent mammograms are reviewed under the imaging tab.  History of abnormal Pap smear: NO - age 30-65 PAP every 5 years with negative HPV co-testing recommended    Reviewed and updated as needed this visit by clinical staff         Reviewed and updated as needed this visit by Provider            Review of Systems  CONSTITUTIONAL: NEGATIVE for fever, chills, change in weight  INTEGUMENTARU/SKIN: NEGATIVE for worrisome  "rashes, moles or lesions  EYES: NEGATIVE for vision changes or irritation  ENT: NEGATIVE for ear, mouth and throat problems  RESP: NEGATIVE for significant cough or SOB  BREAST: NEGATIVE for masses, tenderness or discharge  CV: NEGATIVE for chest pain, palpitations or peripheral edema  GI: NEGATIVE for nausea, abdominal pain, heartburn, or change in bowel habits  : + vaginal dryness and not improving with OTC lubrication  Pt is still getting regular and Mom went into menopause at age 60   MUSCULOSKELETAL: + pelvic pain after childbirth, she has 7 children, pain is intermittent, she takes Advil or Aleve PRN and improves, pain lasts for a couple of days   NEURO: NEGATIVE for weakness, dizziness or paresthesias  PSYCHIATRIC: NEGATIVE for changes in mood or affect     OBJECTIVE:   Physical Exam   BP (!) 140/109  Pulse 76  Temp 97.3  F (36.3  C) (Tympanic)  Resp 14  Ht 5' 8\" (1.727 m)  Wt 209 lb (94.8 kg)  LMP 06/14/2018  SpO2 96%  BMI 31.78 kg/m2  GENERAL: healthy, alert and no distress  EYES: Eyes grossly normal to inspection, PERRL and conjunctivae and sclerae normal  HENT: nose and mouth without ulcers or lesions  NECK: no adenopathy, no asymmetry, masses, or scars and thyroid normal to palpation  RESP: lungs clear to auscultation - no rales, rhonchi or wheezes  CV: regular rate and rhythm, normal S1 S2  ABDOMEN: soft, nontender, no hepatosplenomegaly, no masses and bowel sounds normal  MS: no gross musculoskeletal defects noted, no edema  SKIN: no suspicious lesions or rashes  NEURO: Normal strength and tone, mentation intact and speech normal  PSYCH: mentation appears normal, affect normal/bright    ASSESSMENT/PLAN:     1. Routine general medical examination at a health care facility  - see below   - pt still has regular menstrual cycles, continue to monitor and if cycles change or increase in frequency then would recommend ultrasound for further evaluation   - advised to increase lubrication due to " "vaginal dryness during intercourse     2. Essential hypertension  - losartan (COZAAR) 50 MG tablet; TAKE 1 TABLET BY MOUTH DAILY.  Dispense: 90 tablet; Refill: 0  - amLODIPine (NORVASC) 5 MG tablet; TAKE 1 TABLET BY MOUTH DAILY.  Dispense: 90 tablet; Refill: 0  - Basic metabolic panel  - Albumin Random Urine Quantitative with Creat Ratio  - B/P not controlled, recheck MA  blood pressure check in one month     3. Hyperlipidemia, unspecified hyperlipidemia type  - Lipid Profile (Chol, Trig, HDL, LDL calc)    The 10-year ASCVD risk score (Minerva MEDINA Jr, et al., 2013) is: 13.8%    Values used to calculate the score:      Age: 52 years      Sex: Female      Is Non- : Yes      Diabetic: No      Tobacco smoker: No      Systolic Blood Pressure: 158 mmHg      Is BP treated: Yes      HDL Cholesterol: 32 mg/dL      Total Cholesterol: 163 mg/dL  - pt candidate for statin, advised to f/u to discuss starting statin     COUNSELING:  Reviewed preventive health counseling, as reflected in patient instructions         reports that she has never smoked. She does not have any smokeless tobacco history on file.    Estimated body mass index is 32.03 kg/(m^2) as calculated from the following:    Height as of 4/24/17: 5' 7\" (1.702 m).    Weight as of 4/24/17: 204 lb 8 oz (92.8 kg).   Weight management plan: Discussed healthy diet and exercise guidelines and patient will follow up in 12 months in clinic to re-evaluate.    Counseling Resources:  ATP IV Guidelines  Pooled Cohorts Equation Calculator  Breast Cancer Risk Calculator  FRAX Risk Assessment  ICSI Preventive Guidelines  Dietary Guidelines for Americans, 2010  USDA's MyPlate  ASA Prophylaxis  Lung CA Screening    Raymundo Centeno MD  [facility]  Answers for HPI/ROS submitted by the patient on 6/22/2018   PHQ-2 Score: 0    "

## 2018-06-23 LAB
ANION GAP SERPL CALCULATED.3IONS-SCNC: 11 MMOL/L (ref 3–14)
BUN SERPL-MCNC: 10 MG/DL (ref 7–30)
CALCIUM SERPL-MCNC: 9.5 MG/DL (ref 8.5–10.1)
CHLORIDE SERPL-SCNC: 106 MMOL/L (ref 94–109)
CHOLEST SERPL-MCNC: 163 MG/DL
CO2 SERPL-SCNC: 23 MMOL/L (ref 20–32)
CREAT SERPL-MCNC: 0.83 MG/DL (ref 0.52–1.04)
CREAT UR-MCNC: 240 MG/DL
GFR SERPL CREATININE-BSD FRML MDRD: 72 ML/MIN/1.7M2
GLUCOSE SERPL-MCNC: 81 MG/DL (ref 70–99)
HDLC SERPL-MCNC: 32 MG/DL
LDLC SERPL CALC-MCNC: 113 MG/DL
MICROALBUMIN UR-MCNC: 1380 MG/L
MICROALBUMIN/CREAT UR: 575 MG/G CR (ref 0–25)
NONHDLC SERPL-MCNC: 131 MG/DL
POTASSIUM SERPL-SCNC: 3.7 MMOL/L (ref 3.4–5.3)
SODIUM SERPL-SCNC: 140 MMOL/L (ref 133–144)
TRIGL SERPL-MCNC: 92 MG/DL

## 2018-06-26 NOTE — PROGRESS NOTES
Please send the letter to the patient with the following.         Here are your results for your recent labs.     Your labs show that you have a protein called albumin in your urine. What this means is that when your kidneys are working normally, they prevent protein from leaking into the urine. You are already on the correct medication for this which is your Losartan. This medication helps decrease the amount of protein in the urine and can prevent or slow the progression of kidney disease. We will continue to monitor your labs yearly.    Based on your cholesterol results, you are a candidate for cholesterol lowering medication. Can you please follow up in clinic to further discuss medication options.   You can also improve your cholesterol by controlling the amount and type of fat you eat and by increasing your daily activity level.    Here are some ways to improve your nutrition:  Eat less fat (especially butter, Crisco and other saturated fats)  Buy lean cuts of meat; reduce your portions of red meat or substitute poultry or fish  Use skim milk and low-fat dairy products  Eat no more than 4 egg yolks per week  Avoid fried or fast foods that are high in fat  Eat more fruits and vegetables    Also consider starting or increasing your aerobic activity; this is the best way to improve HDL (good) cholesterol. If aerobic activity would be new to you, please talk with me first about what activities are safe for you.    Please call or message me if you have questions or concerns.

## 2018-07-23 ENCOUNTER — ALLIED HEALTH/NURSE VISIT (OUTPATIENT)
Dept: NURSING | Facility: CLINIC | Age: 53
End: 2018-07-23
Payer: COMMERCIAL

## 2018-07-23 VITALS — HEART RATE: 79 BPM | DIASTOLIC BLOOD PRESSURE: 101 MMHG | SYSTOLIC BLOOD PRESSURE: 143 MMHG

## 2018-07-23 DIAGNOSIS — I10 ESSENTIAL HYPERTENSION: Primary | ICD-10-CM

## 2018-07-23 PROCEDURE — 99207 ZZC NO CHARGE NURSE ONLY: CPT

## 2018-07-23 NOTE — NURSING NOTE
Aubree Yao is a 52 year old year old patient who comes in today for a Blood Pressure check because of ongoing blood pressure monitoring.    Patient is taking medication as prescribed  Patient is tolerating medications well.  Patient is not monitoring Blood Pressure at home.    Current complaints: none  Disposition: made f/u with provider.  MONY Smith

## 2018-07-23 NOTE — NURSING NOTE
Patient's BP was high. I talked to Dr. Centeno and per Dr. Centeno ok she can increase to 10 mg daily and recheck B/P check in one month.  Patient also would need to schedule OV as she also needs to start cholesterol lowering medication. Patient schedule appointment to August 27th to see Dr. Centeno.    Tim Gallardo CMA

## 2018-07-23 NOTE — MR AVS SNAPSHOT
After Visit Summary   7/23/2018    Aubree Yao    MRN: [id number]           Patient Information     Date Of Birth          1965        Visit Information        Provider Department      7/23/2018 5:00 PM HW MEDICAL ASSISTANT [facility]        Today's Diagnoses     Essential hypertension    -  1       Follow-ups after your visit        Your next 10 appointments already scheduled     Aug 27, 2018 10:00 AM CDT   SHORT with Raymundo Centeno MD   [facility] ([facility]    [address] [place] [address] [phone]              Who to contact     If you have questions or need follow up information about today's clinic visit or your schedule please contact [facility] directly at [phone].  Normal or non-critical lab and imaging results will be communicated to you by MyChart, letter or phone within 4 business days after the clinic has received the results. If you do not hear from us within 7 days, please contact the clinic through MyChart or phone. If you have a critical or abnormal lab result, we will notify you by phone as soon as possible.  Submit refill requests through [email or web address] or call your pharmacy and they will forward the refill request to us. Please allow 3 business days for your refill to be completed.          Additional Information About Your Visit        Care EveryWhere ID     This is your Care EveryWhere ID. This could be used by other organizations to access your [facility] medical records  [id number]        Your Vitals Were     Pulse                   79            Blood Pressure from Last 3 Encounters:   07/23/18 (!) 143/101   06/22/18 (!) 158/116   04/24/17 138/81    Weight from Last 3 Encounters:   06/22/18 209 lb (94.8 kg)   04/24/17 204 lb 8 oz (92.8 kg)   02/29/16 198 lb (89.8 kg)              Today, you had the following     No orders found for display       Primary Care Provider  Office Phone # Fax #    Cathryn Craft PA-C [phone]       [address]   [facility] [address]        Equal Access to Services     LON LIZAMA : Hadii aad ku hadasho Soomaali, waaxda luqadaha, qaybta kaalmada adeegyada, waxmilla garcian destiny calle laJuliuskaren bustillos. So [facility] [phone].    ATENCIÓN: Si habla español, tiene a perez disposición servicios gratuitos de asistencia lingüística. Llame al [phone].    We comply with applicable federal civil rights laws and [place] laws. We do not discriminate on the basis of race, color, national origin, age, disability, sex, sexual orientation, or gender identity.            Thank you!     Thank you for choosing [facility]  for your care. Our goal is always to provide you with excellent care. Hearing back from our patients is one way we can continue to improve our services. Please take a few minutes to complete the written survey that you may receive in the mail after your visit with us. Thank you!             Your Updated Medication List - Protect others around you: Learn how to safely use, store and throw away your medicines at www.disposemymeds.org.          This list is accurate as of 7/23/18  5:31 PM.  Always use your most recent med list.                   Brand Name Dispense Instructions for use Diagnosis    amLODIPine 5 MG tablet    NORVASC    90 tablet    TAKE 1 TABLET BY MOUTH DAILY.    Essential hypertension       losartan 50 MG tablet    COZAAR    90 tablet    TAKE 1 TABLET BY MOUTH DAILY.    Essential hypertension

## 2018-07-23 NOTE — NURSING NOTE
Aubree Yao is a 52 year old patient who comes in today for a Blood Pressure check.  Initial BP:  BP (!) 143/101 (BP Location: Left arm, Patient Position: Sitting, Cuff Size: Adult Large)  Pulse 79     79  Disposition: provider notified while patient in the clinic    Tim Gallardo CMA

## 2018-08-17 DIAGNOSIS — I10 ESSENTIAL HYPERTENSION: ICD-10-CM

## 2018-08-17 NOTE — TELEPHONE ENCOUNTER
"Requested Prescriptions   Pending Prescriptions Disp Refills     losartan (COZAAR) 50 MG tablet [Pharmacy Med Name: LOSARTAN 50MG TABLETS]  Last Written Prescription Date:  6/22/2018  Last Fill Quantity: 90 tabs,  # refills: 0   Last office visit: 6/22/2018 with prescribing provider:  Jacobo Platt Office Visit:   Next 5 appointments (look out 90 days)     Aug 27, 2018 10:00 AM CDT   [facility]    [address] [place] [address] [phone]                  90 tablet 0     Sig: TAKE 1 TABLET BY MOUTH DAILY.    Angiotensin-II Receptors Failed    8/17/2018  7:24 AM       Failed - Blood pressure under 140/90 in past 12 months    BP Readings from Last 3 Encounters:   07/23/18 (!) 143/101   06/22/18 (!) 158/116   04/24/17 138/81                Passed - Recent (12 mo) or future (30 days) visit within the authorizing provider's specialty    Patient had office visit in the last 12 months or has a visit in the next 30 days with authorizing provider or within the authorizing provider's specialty.  See \"Patient Info\" tab in inbasket, or \"Choose Columns\" in Meds & Orders section of the refill encounter.           Passed - Patient is age 18 or older       Passed - No active pregnancy on record       Passed - Normal serum creatinine on file in past 12 months    Recent Labs   Lab Test  06/22/18   1650   CR  0.83            Passed - Normal serum potassium on file in past 12 months    Recent Labs   Lab Test  06/22/18   1650   POTASSIUM  3.7                   Passed - No positive pregnancy test in past 12 months        amLODIPine (NORVASC) 5 MG tablet [Pharmacy Med Name: AMLODIPINE BESYLATE 5MG TABLETS]  Last Written Prescription Date:  6/22/2018  Last Fill Quantity: 90 tabs,  # refills: 0   Last office visit: 6/22/2018 with prescribing provider:  Jacobo Platt Office Visit:   Next 5 appointments (look out 90 days)     Aug 27, " "2018 10:00 AM CDT   SHORT with Raymundo Centeno MD   [facility] ([facility])    [address] [place] [address] [phone]                  90 tablet 0     Sig: TAKE 1 TABLET BY MOUTH DAILY.    Calcium Channel Blockers Protocol  Failed    8/17/2018  7:24 AM       Failed - Blood pressure under 140/90 in past 12 months    BP Readings from Last 3 Encounters:   07/23/18 (!) 143/101   06/22/18 (!) 158/116   04/24/17 138/81                Passed - Recent (12 mo) or future (30 days) visit within the authorizing provider's specialty    Patient had office visit in the last 12 months or has a visit in the next 30 days with authorizing provider or within the authorizing provider's specialty.  See \"Patient Info\" tab in inbasket, or \"Choose Columns\" in Meds & Orders section of the refill encounter.           Passed - Patient is age 18 or older       Passed - No active pregnancy on record       Passed - Normal serum creatinine on file in past 12 months    Recent Labs   Lab Test  06/22/18   1650   CR  0.83            Passed - No positive pregnancy test in past 12 months          "

## 2018-08-22 RX ORDER — LOSARTAN POTASSIUM 50 MG/1
TABLET ORAL
Qty: 7 TABLET | Refills: 0 | Status: SHIPPED | OUTPATIENT
Start: 2018-08-22 | End: 2018-09-24

## 2018-08-22 RX ORDER — AMLODIPINE BESYLATE 5 MG/1
TABLET ORAL
Qty: 7 TABLET | Refills: 0 | Status: SHIPPED | OUTPATIENT
Start: 2018-08-22 | End: 2018-10-01

## 2018-09-17 DIAGNOSIS — I10 ESSENTIAL HYPERTENSION: ICD-10-CM

## 2018-09-18 DIAGNOSIS — I10 ESSENTIAL HYPERTENSION: ICD-10-CM

## 2018-09-18 RX ORDER — LOSARTAN POTASSIUM 50 MG/1
TABLET ORAL
Qty: 7 TABLET | Refills: 0 | Status: CANCELLED | OUTPATIENT
Start: 2018-09-18

## 2018-09-18 RX ORDER — AMLODIPINE BESYLATE 5 MG/1
TABLET ORAL
Qty: 30 TABLET | Refills: 0 | Status: SHIPPED | OUTPATIENT
Start: 2018-09-18 | End: 2018-10-01

## 2018-09-18 RX ORDER — AMLODIPINE BESYLATE 5 MG/1
TABLET ORAL
Qty: 7 TABLET | Refills: 0 | Status: CANCELLED | OUTPATIENT
Start: 2018-09-18

## 2018-09-18 NOTE — TELEPHONE ENCOUNTER
"Requested Prescriptions   Pending Prescriptions Disp Refills     amLODIPine (NORVASC) 5 MG tablet [Pharmacy Med Name: AMLODIPINE BESYLATE 5MG TABLETS]  Last Written Prescription Date:  8/22/2018  Last Fill Quantity: 7 tablet,  # refills: 0   Last Office Visit: 6/22/2018   Future Office Visit:    Next 5 appointments (look out 90 days)     Oct 01, 2018  6:00 PM CDT   Office Visit with Yanci Marshall PA-C   [facility]    [address] [place] [address] [phone]                90 tablet 0     Sig: TAKE 1 TABLET BY MOUTH DAILY.    Calcium Channel Blockers Protocol  Failed    9/17/2018  6:59 PM       Failed - Blood pressure under 140/90 in past 12 months    BP Readings from Last 3 Encounters:   07/23/18 (!) 143/101   06/22/18 (!) 158/116   04/24/17 138/81          Passed - Recent (12 mo) or future (30 days) visit within the authorizing provider's specialty    Patient had office visit in the last 12 months or has a visit in the next 30 days with authorizing provider or within the authorizing provider's specialty.  See \"Patient Info\" tab in inbasket, or \"Choose Columns\" in Meds & Orders section of the refill encounter.           Passed - Patient is age 18 or older       Passed - No active pregnancy on record       Passed - Normal serum creatinine on file in past 12 months    Recent Labs   Lab Test  06/22/18   1650   CR  0.83          Passed - No positive pregnancy test in past 12 months          "

## 2018-09-18 NOTE — TELEPHONE ENCOUNTER
Reason for Call:  Medication or medication refill:    Do you use a [facility]?  Name of the pharmacy and phone number for the current request:  [id number] [address] [place] [address] AT [address] [place] [address]    Name of the medication requested:  - amLODIPine (NORVASC) 5 MG tablet  - losartan (COZAAR) 50 MG tablet    Other request: Patient is requesting refills on the medications above. States she is out. She does not want to increase her BP medication and doesn't want to start a cholesterol lowering medication. She is scheduled to follow up on her blood pressure and follow up on / discuss cholesterol on 10/1/2018 at 6:00 PM with Marlo Marshall. Please assist. Thanks!    Can we leave a detailed message on this number? YES    Phone number patient can be reached at: Home number on file [phone] (home)    Best Time: Any    Call taken on 9/18/2018 at 11:21 AM by Dolly Laguna

## 2018-09-18 NOTE — TELEPHONE ENCOUNTER
"Requested Prescriptions   Pending Prescriptions Disp Refills     amLODIPine (NORVASC) 5 MG tablet  Last Written Prescription Date:  8/22/2018  Last Fill Quantity: 7 tablet,  # refills: 0   Last Office Visit: 6/22/2018   Future Office Visit:    Next 5 appointments (look out 90 days)     Oct 01, 2018  6:00 PM CDT   Office Visit with Yanci Marshall PA-C   [facility]    [address] [place] [address] [phone]                7 tablet 0    Calcium Channel Blockers Protocol  Failed    9/18/2018 11:25 AM       Failed - Blood pressure under 140/90 in past 12 months    BP Readings from Last 3 Encounters:   07/23/18 (!) 143/101   06/22/18 (!) 158/116   04/24/17 138/81          Passed - Recent (12 mo) or future (30 days) visit within the authorizing provider's specialty    Patient had office visit in the last 12 months or has a visit in the next 30 days with authorizing provider or within the authorizing provider's specialty.  See \"Patient Info\" tab in inbasket, or \"Choose Columns\" in Meds & Orders section of the refill encounter.           Passed - Patient is age 18 or older       Passed - No active pregnancy on record       Passed - Normal serum creatinine on file in past 12 months    Recent Labs   Lab Test  06/22/18   1650   CR  0.83          Passed - No positive pregnancy test in past 12 months     _________________________________________________________________________________________________________________________       losartan (COZAAR) 50 MG tablet  Last Written Prescription Date:  8/22/2018  Last Fill Quantity: 7 tablet,  # refills: 0   Last Office Visit: 6/22/2018   Future Office Visit:    Next 5 appointments (look out 90 days)     Oct 01, 2018  6:00 PM CDT   Office Visit with Yanci Marshall PA-C   [facility]    [address] [place] [address] [phone]            " "    7 tablet 0    Angiotensin-II Receptors Failed    9/18/2018 11:25 AM       Failed - Blood pressure under 140/90 in past 12 months    BP Readings from Last 3 Encounters:   07/23/18 (!) 143/101   06/22/18 (!) 158/116   04/24/17 138/81          Passed - Recent (12 mo) or future (30 days) visit within the authorizing provider's specialty    Patient had office visit in the last 12 months or has a visit in the next 30 days with authorizing provider or within the authorizing provider's specialty.  See \"Patient Info\" tab in inbasket, or \"Choose Columns\" in Meds & Orders section of the refill encounter.           Passed - Patient is age 18 or older       Passed - No active pregnancy on record       Passed - Normal serum creatinine on file in past 12 months    Recent Labs   Lab Test  06/22/18   1650   CR  0.83          Passed - Normal serum potassium on file in past 12 months    Recent Labs   Lab Test  06/22/18   1650   POTASSIUM  3.7           Passed - No positive pregnancy test in past 12 months          "

## 2018-09-24 RX ORDER — LOSARTAN POTASSIUM 50 MG/1
TABLET ORAL
Qty: 30 TABLET | Refills: 0 | Status: SHIPPED | OUTPATIENT
Start: 2018-09-24 | End: 2018-10-01

## 2018-09-24 NOTE — TELEPHONE ENCOUNTER
"Routing refill request to provider for review/approval because:  --Last blood pressure's not at goal.  --Patient has appointment 10/1/18.  --Patient is out of medication.  --Out of losartan for couple weeks.  --She is not out of amlodipine but she has been taking 5 mg daily not the 10 mg.    --6/22/18 last office visit plan - \"B/P not controlled, recheck MA  blood pressure check in one month \"  --7/23/18 f/u appointment with MA - \"Patient's BP was high. I talked to Dr. Centeno and per Dr. Centeno ok she can increase to 10 mg daily and recheck B/P check in one month.  Patient also would need to schedule OV as she also needs to start cholesterol lowering medication. Patient schedule appointment to August 27th to see Dr. Centeno.  Tim Gallardo [facility]\"    BP Readings from Last 6 Encounters:   07/23/18 (!) 143/101   06/22/18 (!) 158/116   04/24/17 138/81   08/22/16 142/82   08/26/16 132/80   02/29/16 140/84       "

## 2018-09-24 NOTE — TELEPHONE ENCOUNTER
Medication refilled.  RN, can you please inform pt of below message -   B/P elevated, I would recommend increasing Norvasc from 5 to 10 mg daily.   Follow up as scheduled.   DM

## 2018-10-01 ENCOUNTER — OFFICE VISIT (OUTPATIENT)
Dept: FAMILY MEDICINE | Facility: CLINIC | Age: 53
End: 2018-10-01
Payer: COMMERCIAL

## 2018-10-01 VITALS
WEIGHT: 203.5 LBS | BODY MASS INDEX: 30.94 KG/M2 | RESPIRATION RATE: 16 BRPM | TEMPERATURE: 97.4 F | DIASTOLIC BLOOD PRESSURE: 85 MMHG | HEART RATE: 66 BPM | SYSTOLIC BLOOD PRESSURE: 134 MMHG | OXYGEN SATURATION: 99 %

## 2018-10-01 DIAGNOSIS — E78.5 HYPERLIPIDEMIA LDL GOAL <70: ICD-10-CM

## 2018-10-01 DIAGNOSIS — Z23 NEED FOR PROPHYLACTIC VACCINATION AND INOCULATION AGAINST INFLUENZA: ICD-10-CM

## 2018-10-01 DIAGNOSIS — I10 ESSENTIAL HYPERTENSION: Primary | ICD-10-CM

## 2018-10-01 PROCEDURE — 99214 OFFICE O/P EST MOD 30 MIN: CPT | Mod: 25 | Performed by: PHYSICIAN ASSISTANT

## 2018-10-01 PROCEDURE — 90682 RIV4 VACC RECOMBINANT DNA IM: CPT | Performed by: PHYSICIAN ASSISTANT

## 2018-10-01 PROCEDURE — 90471 IMMUNIZATION ADMIN: CPT | Performed by: PHYSICIAN ASSISTANT

## 2018-10-01 RX ORDER — LOSARTAN POTASSIUM 50 MG/1
TABLET ORAL
Qty: 90 TABLET | Refills: 3 | Status: SHIPPED | OUTPATIENT
Start: 2018-10-01 | End: 2019-10-31

## 2018-10-01 RX ORDER — AMLODIPINE BESYLATE 10 MG/1
10 TABLET ORAL DAILY
Qty: 90 TABLET | Refills: 3 | Status: SHIPPED | OUTPATIENT
Start: 2018-10-01 | End: 2019-10-31

## 2018-10-01 NOTE — MR AVS SNAPSHOT
After Visit Summary   10/1/2018    Aubree Yao    MRN: [id number]           Patient Information     Date Of Birth          1965        Visit Information        Provider Department      10/1/2018 6:00 PM Yanci Marshall PA-C [facility]        Today's Diagnoses     Essential hypertension    -  1    Need for prophylactic vaccination and inoculation against influenza          Care Instructions    Refills sent to pharmacy today.  Consider switch to combo pill of AMLODIPINE BESY-BENAZEPRIL HCL 10-20 MG PO CAPS in near future if preferred.    Please make a lab only appointment prior to returning to clinic for fasting bloodwork (nothing to eat or drink x 6-8 hours) - labs ordered today.     Return to clinic with any worsening or changes in symptoms and follow up with PCP for routine care.           Follow-ups after your visit        Future tests that were ordered for you today     Open Future Orders        Priority Expected Expires Ordered    Comprehensive metabolic panel Routine  10/1/2019 10/1/2018    Lipid panel reflex to direct LDL Fasting Routine  10/1/2019 10/1/2018            Who to contact     If you have questions or need follow up information about today's clinic visit or your schedule please contact [facility] directly at [phone].  Normal or non-critical lab and imaging results will be communicated to you by MyChart, letter or phone within 4 business days after the clinic has received the results. If you do not hear from us within 7 days, please contact the clinic through MyChart or phone. If you have a critical or abnormal lab result, we will notify you by phone as soon as possible.  Submit refill requests through [facility] or call your pharmacy and they will forward the refill request to us. Please allow 3 business days for your refill to be completed.          Additional Information About Your Visit        Care EveryWhere ID     This  is your Care EveryWhere ID. This could be used by other organizations to access your Central medical records  [id number]        Your Vitals Were     Pulse Temperature Respirations Last Period Pulse Oximetry BMI (Body Mass Index)    66 97.4  F (36.3  C) (Oral) 16 09/07/2018 (Approximate) 99% 30.94 kg/m2       Blood Pressure from Last 3 Encounters:   10/01/18 134/85   07/23/18 (!) 143/101   06/22/18 (!) 158/116    Weight from Last 3 Encounters:   10/01/18 203 lb 8 oz (92.3 kg)   06/22/18 209 lb (94.8 kg)   04/24/17 204 lb 8 oz (92.8 kg)              We Performed the Following     FLU VACCINE, SPLIT VIRUS, IM (QUADRIVALENT) [35665]- >3 YRS     Vaccine Administration, Initial [71753]          Today's Medication Changes          These changes are accurate as of 10/1/18  6:19 PM.  If you have any questions, ask your nurse or doctor.               These medicines have changed or have updated prescriptions.        Dose/Directions    amLODIPine 10 MG tablet   Commonly known as:  NORVASC   This may have changed:    - medication strength  - See the new instructions.  - Another medication with the same name was removed. Continue taking this medication, and follow the directions you see here.   Used for:  Essential hypertension   Changed by:  Yanci Marshall PA-C        Dose:  10 mg   Take 1 tablet (10 mg) by mouth daily   Quantity:  90 tablet   Refills:  3            Where to get your medicines      [facility] [address] [place] [address] AT [address] [place] [id number]     Phone:  [phone]     amLODIPine 10 MG tablet    losartan 50 MG tablet                Primary Care Provider Office Phone # Fax #    Cathryn Craft PA-C [phone]       [address] [address]        Equal Access to Services     LON LIZAMA AH: Hadii mercedes Lopez,  keisha beronicawesleyebonie dixonprince badillo julesrobert bustillos. So [facility] [phone].    ATENCIÓN: Si ricco ford, tiene a perez disposición servicios gratuitos de asistencia lingüística. Albin al [phone].    We comply with applicable federal civil rights laws and [place] laws. We do not discriminate on the basis of race, color, national origin, age, disability, sex, sexual orientation, or gender identity.            Thank you!     Thank you for choosing [facility]  for your care. Our goal is always to provide you with excellent care. Hearing back from our patients is one way we can continue to improve our services. Please take a few minutes to complete the written survey that you may receive in the mail after your visit with us. Thank you!             Your Updated Medication List - Protect others around you: Learn how to safely use, store and throw away your medicines at www.disposemymeds.org.          This list is accurate as of 10/1/18  6:19 PM.  Always use your most recent med list.                   Brand Name Dispense Instructions for use Diagnosis    amLODIPine 10 MG tablet    NORVASC    90 tablet    Take 1 tablet (10 mg) by mouth daily    Essential hypertension       losartan 50 MG tablet    COZAAR    90 tablet    TAKE 1 TABLET BY MOUTH DAILY.    Essential hypertension

## 2018-10-01 NOTE — PATIENT INSTRUCTIONS
Refills sent to pharmacy today.  Consider switch to combo pill of AMLODIPINE BESY-BENAZEPRIL HCL 10-20 MG PO CAPS in near future if preferred.    Please make a lab only appointment prior to returning to clinic for fasting bloodwork (nothing to eat or drink x 6-8 hours) - labs ordered today.     Return to clinic with any worsening or changes in symptoms and follow up with PCP for routine care.

## 2018-10-01 NOTE — PROGRESS NOTES

## 2018-10-01 NOTE — LETTER
[facility]  [address] [place] [id number] [phone]        October 7, 2019    Aubree Yao  [address] [facility] [address]              Dear Aubree Yao    This is to remind you that your fasting lab is due.    You may call our office at [phone] to schedule an appointment.    Please disregard this notice if you have already had your labs drawn or made an appointment.        Sincerely,        Yanci Marshall PA-C

## 2018-10-01 NOTE — PROGRESS NOTES
SUBJECTIVE:   Aubree Yao is a 52 year old female who presents to clinic today for the following health issues:    Hyperlipidemia Follow-Up      Rate your low fat/cholesterol diet?: fair    Taking statin?  No    Other lipid medications/supplements?:  none    Hypertension Follow-up      Outpatient blood pressures are not being checked.    Low Salt Diet: no added salt    Patient taking Amlodipine 10 mg daily (higher dose just for the past week though) with Losartan 50 mg daily.      Amount of exercise or physical activity: 1 day/week for an average of less than 15 minutes    Problems taking medications regularly: No    Medication side effects: tired from losartan     Diet: regular (no restrictions)    Patient working with [facility] on weight loss and lifestyle change program for the past 4 weeks.  Patient has lost 6# since starting that.      Problem list and histories reviewed & adjusted, as indicated.  Additional history: as documented    Patient Active Problem List   Diagnosis     Essential hypertension     Preeclampsia     S/P      Low back pain     Abnormal mammogram     Non morbid obesity due to excess calories     Hyperlipidemia LDL goal <70     History reviewed. No pertinent surgical history.    Social History   Substance Use Topics     Smoking status: Never Smoker     Smokeless tobacco: Never Used     Alcohol use No     Family History   Problem Relation Age of Onset     Cerebrovascular Disease Mother      from non treated HTN - alive     Hypertension Mother      Didn't manage with meds     Hypertension Paternal Grandfather          Current Outpatient Prescriptions   Medication Sig Dispense Refill     amLODIPine (NORVASC) 10 MG tablet Take 1 tablet (10 mg) by mouth daily 90 tablet 3     losartan (COZAAR) 50 MG tablet TAKE 1 TABLET BY MOUTH DAILY. 90 tablet 3     No Known Allergies  Recent Labs   Lab Test  18   1650  17   1526  16   1037  16   1036   14   0930    04/22/13   1003   10/10/10   0420   LDL  113*  104*   --   110*   --    --    --   126   < >   --    HDL  32*  31*   --   36*   --    --    --   36*   < >   --    TRIG  92  81   --   46   --    --    --   72   < >   --    ALT   --    --   20   --    --    --    --   25   --   13   CR  0.83  0.88  0.68   --    < >  0.77   < >  0.83   < >  0.80   GFRESTIMATED  72  68  >90  Non  GFR Calc     --    --   79   < >  74   < >  78   GFRESTBLACK  87  82  >90   GFR Calc     --    --   >90   GFR Calc     < >  89   < >  >90   POTASSIUM  3.7  3.9  4.0   --    < >  3.2*   < >  3.9   < >  3.1*   TSH   --    --    --   0.61   --   0.70   --    --    < >   --     < > = values in this interval not displayed.      BP Readings from Last 3 Encounters:   10/01/18 134/85   07/23/18 (!) 143/101   06/22/18 (!) 158/116    Wt Readings from Last 3 Encounters:   10/01/18 203 lb 8 oz (92.3 kg)   06/22/18 209 lb (94.8 kg)   04/24/17 204 lb 8 oz (92.8 kg)               Labs reviewed in EPIC    Reviewed and updated as needed this visit by clinical staff  Tobacco  Allergies  Meds  Problems  Med Hx  Surg Hx  Fam Hx  Soc Hx        Reviewed and updated as needed this visit by Provider  Allergies  Meds  Problems         ROS:  Constitutional, HEENT, cardiovascular, pulmonary, GI, , musculoskeletal, neuro, skin, endocrine and psych systems are negative, except as otherwise noted.    OBJECTIVE:     /85 (BP Location: Left arm, Patient Position: Sitting, Cuff Size: Adult Large)  Pulse 66  Temp 97.4  F (36.3  C) (Oral)  Resp 16  Wt 203 lb 8 oz (92.3 kg)  LMP 09/07/2018 (Approximate)  SpO2 99%  BMI 30.94 kg/m2  Body mass index is 30.94 kg/(m^2).  GENERAL: healthy, alert and no distress  RESP: lungs clear to auscultation - no rales, rhonchi or wheezes  CV: regular rate and rhythm, normal S1 S2, no S3 or S4, no murmur, click or rub, no peripheral edema and peripheral pulses strong  PSYCH:  mentation appears normal, affect normal/bright    Diagnostic Test Results:  none     ASSESSMENT/PLAN:     Hyperlipidemia; stable   Plan:  No changes in the patient's current treatment plan; Hold on statin at this time, but repeat labs in 6 months with lab only.  Labs done previously. (ASCVD risk down from 13.8% when BP was uncontrolled)  The 10-year ASCVD risk score (Minerva MEDINA Jr, et al., 2013) is: 7.5%    Values used to calculate the score:      Age: 52 years      Sex: Female      Is Non- : Yes      Diabetic: No      Tobacco smoker: No      Systolic Blood Pressure: 134 mmHg      Is BP treated: Yes      HDL Cholesterol: 32 mg/dL      Total Cholesterol: 163 mg/dL       Hypertension; controlled   Associated with the following complications:    None   Plan:  No changes in the patient's current treatment plan; refills sent to pharmacy.        ICD-10-CM    1. Essential hypertension I10 amLODIPine (NORVASC) 10 MG tablet     losartan (COZAAR) 50 MG tablet     Comprehensive metabolic panel     Lipid panel reflex to direct LDL Fasting   2. Hyperlipidemia LDL goal <70 E78.5    3. Need for prophylactic vaccination and inoculation against influenza Z23 Vaccine Administration, Initial [53907]     FLU VACCINE, (RIV4) RECOMBINANT HA  , IM (FluBlok, egg free) [42281]- >18 YRS (FMG recommended  50-64 YRS)     CANCELED: FLU VACCINE, SPLIT VIRUS, IM (QUADRIVALENT) [14117]- >3 YRS       Patient Instructions   Refills sent to pharmacy today.  Consider switch to combo pill of AMLODIPINE BESY-BENAZEPRIL HCL 10-20 MG PO CAPS in near future if preferred.    Please make a lab only appointment prior to returning to clinic for fasting bloodwork (nothing to eat or drink x 6-8 hours) - labs ordered today.     Return to clinic with any worsening or changes in symptoms and follow up with PCP for routine care.       Yanci Marshall PA-C  [facility]

## 2018-12-06 ENCOUNTER — TELEPHONE (OUTPATIENT)
Dept: FAMILY MEDICINE | Facility: CLINIC | Age: 53
End: 2018-12-06

## 2018-12-06 NOTE — TELEPHONE ENCOUNTER
Reason for Call:  Medication or medication refill:    Name of the medication requested: amLODIPine (NORVASC) 10 MG tablet    Other request: Patient states this medication is on the news as being on a recall list for the combination tablet and she would like a call back to discuss. Please assist. Thanks!    Can we leave a detailed message on this number? YES    Phone number patient can be reached at: Home number on file [phone] (home)    Best Time: Any    Call taken on 12/6/2018 at 9:51 AM by Dolly Laguna

## 2018-12-06 NOTE — TELEPHONE ENCOUNTER
[facility]ine on Oct 2018. I spoke to [facility] pharmacist.     The recall is on the combo with the valsartan so the plain amlodipine is not a problem    This info left on pt's voice mail per her permission noted below. Message left that her lot was not recalled but she can call us if she has questions    Olga Gu, RN, BSN

## 2019-01-24 ENCOUNTER — TELEPHONE (OUTPATIENT)
Dept: FAMILY MEDICINE | Facility: CLINIC | Age: 54
End: 2019-01-24

## 2019-01-24 NOTE — TELEPHONE ENCOUNTER
Phone call to patient     She states she has heard about another recall on amlodipine and wonders what she needs to do     Writer advised patient to contact the pharmacy that dispensed the medication for her as they will have the  and lot numbers of dispensed medications and will know if she was given one of the recalled medications - clinic does not have this information     Patient will return all to clinic if any other concerns     Alexsandra Santos, Registered Nurse   [facility]

## 2019-01-24 NOTE — TELEPHONE ENCOUNTER
Reason for Call:  Other prescription    Detailed comments: Pt states she heard about another recall with her amLODIPine (NORVASC) 10 MG tablet medication. States its different from the last time se called and would like to speak to a RN to make sure she is not affected.     Phone Number Patient can be reached at: Home number on file [phone] (home)    Best Time: anytime    Can we leave a detailed message on this number? YES    Call taken on 1/24/2019 at 10:47 AM by Maryana Pak

## 2019-07-08 ENCOUNTER — ANCILLARY PROCEDURE (OUTPATIENT)
Dept: MAMMOGRAPHY | Facility: CLINIC | Age: 54
End: 2019-07-08
Attending: FAMILY MEDICINE
Payer: COMMERCIAL

## 2019-07-08 DIAGNOSIS — Z12.31 VISIT FOR SCREENING MAMMOGRAM: ICD-10-CM

## 2019-07-08 PROCEDURE — 77067 SCR MAMMO BI INCL CAD: CPT

## 2019-10-14 ENCOUNTER — ALLIED HEALTH/NURSE VISIT (OUTPATIENT)
Dept: NURSING | Facility: CLINIC | Age: 54
End: 2019-10-14
Payer: COMMERCIAL

## 2019-10-14 DIAGNOSIS — Z23 NEED FOR PROPHYLACTIC VACCINATION AND INOCULATION AGAINST INFLUENZA: Primary | ICD-10-CM

## 2019-10-14 PROCEDURE — 90471 IMMUNIZATION ADMIN: CPT

## 2019-10-14 PROCEDURE — 90682 RIV4 VACC RECOMBINANT DNA IM: CPT

## 2019-10-31 ENCOUNTER — OFFICE VISIT (OUTPATIENT)
Dept: FAMILY MEDICINE | Facility: CLINIC | Age: 54
End: 2019-10-31
Payer: COMMERCIAL

## 2019-10-31 VITALS
BODY MASS INDEX: 31.86 KG/M2 | TEMPERATURE: 98 F | HEART RATE: 65 BPM | SYSTOLIC BLOOD PRESSURE: 124 MMHG | OXYGEN SATURATION: 99 % | HEIGHT: 67 IN | WEIGHT: 203 LBS | RESPIRATION RATE: 20 BRPM | DIASTOLIC BLOOD PRESSURE: 80 MMHG

## 2019-10-31 DIAGNOSIS — M25.551 HIP PAIN, RIGHT: ICD-10-CM

## 2019-10-31 DIAGNOSIS — Z01.411 ENCOUNTER FOR GYNECOLOGICAL EXAMINATION WITH ABNORMAL FINDING: Primary | ICD-10-CM

## 2019-10-31 DIAGNOSIS — I10 ESSENTIAL HYPERTENSION: ICD-10-CM

## 2019-10-31 PROCEDURE — G0145 SCR C/V CYTO,THINLAYER,RESCR: HCPCS | Performed by: PHYSICIAN ASSISTANT

## 2019-10-31 PROCEDURE — 80061 LIPID PANEL: CPT | Performed by: PHYSICIAN ASSISTANT

## 2019-10-31 PROCEDURE — 99396 PREV VISIT EST AGE 40-64: CPT | Performed by: PHYSICIAN ASSISTANT

## 2019-10-31 PROCEDURE — 87624 HPV HI-RISK TYP POOLED RSLT: CPT | Performed by: PHYSICIAN ASSISTANT

## 2019-10-31 PROCEDURE — 80053 COMPREHEN METABOLIC PANEL: CPT | Performed by: PHYSICIAN ASSISTANT

## 2019-10-31 PROCEDURE — 99213 OFFICE O/P EST LOW 20 MIN: CPT | Mod: 25 | Performed by: PHYSICIAN ASSISTANT

## 2019-10-31 PROCEDURE — 36415 COLL VENOUS BLD VENIPUNCTURE: CPT | Performed by: PHYSICIAN ASSISTANT

## 2019-10-31 RX ORDER — CYCLOBENZAPRINE HCL 10 MG
10 TABLET ORAL
Qty: 30 TABLET | Refills: 0 | Status: SHIPPED | OUTPATIENT
Start: 2019-10-31 | End: 2021-06-18

## 2019-10-31 RX ORDER — LOSARTAN POTASSIUM 50 MG/1
TABLET ORAL
Qty: 90 TABLET | Refills: 3 | Status: SHIPPED | OUTPATIENT
Start: 2019-10-31 | End: 2020-09-17

## 2019-10-31 RX ORDER — AMLODIPINE BESYLATE 10 MG/1
10 TABLET ORAL DAILY
Qty: 90 TABLET | Refills: 3 | Status: SHIPPED | OUTPATIENT
Start: 2019-10-31 | End: 2020-12-24

## 2019-10-31 ASSESSMENT — ENCOUNTER SYMPTOMS
FEVER: 0
CHILLS: 0
HEMATURIA: 0
CONSTIPATION: 0
COUGH: 0
ABDOMINAL PAIN: 0
HEMATOCHEZIA: 0
EYE PAIN: 0
DIZZINESS: 0
DIARRHEA: 0
NERVOUS/ANXIOUS: 0

## 2019-10-31 ASSESSMENT — MIFFLIN-ST. JEOR: SCORE: 1545.49

## 2019-10-31 NOTE — LETTER
November 1, 2019      Aubree Mirlande Yao  [address] [facility] [address]        Dear Ms.Glenn Yao,    We are writing to inform you of your test results.    This letter is sent to inform you of recent test results and to provide you with personal records of health information.     Your attached labs are overall within normal limits and stable.   Continue to work on diet and exercise.   Desired or goal levels are:   CHOLESTEROL: Desirable is less than 200.   HDL (Good Cholesterol): Desirable is greater than 40 in men and greater than 50 in women.   LDL (Bad Cholesterol): Desirable is less than 130 or less than 100 in patients with diabetes or vascular disease. For some patients with diabetes or vascular disease, the desireable LDL is less that 70.   TRIGLYCERIDES: Desirable is less than 150.     I recommend that you take Omega-3 fatty acids (available in capsules) to improve your overall levels. You need 2000 - 4000 mg daily of EPA + DHA. This usually means 4 - 8 capsules a day, depending on the strength you buy or you can try taking red yeast rice (an herbal supplement that contains a natural statin) start with 600 mg once daily and increase to 1200 mg twice daily as tolerated.     As you may know, abnormal cholesterol is one factor that increases your risk for heart disease and stroke. You can improve your cholesterol by controlling the amount and type of fat you eat and by increasing your daily activity level.     Here are some ways to improve your nutrition (adapted from the American Academy of Family Practice handout):   Eat less fat (especially butter, Crisco and other saturated fats)   Buy lean cuts of meat; reduce your portions of red meat or substitute poultry or fish   Use skim milk and low-fat dairy products   Eat no more than 4 egg yolks per week   Avoid fried or fast foods that are high in fat   Eat more fruits and vegetables     Also consider starting or increasing your aerobic  activity; this is the best way to improve HDL (good) cholesterol. If aerobic activity would be new to you, please talk with me first about what activities are safe for you.     Thank you for allowing me to participate in your care. If you have any further questions or problems, please contact me at [phone].     Resulted Orders   Lipid panel reflex to direct LDL Fasting   Result Value Ref Range    Cholesterol 194 <200 mg/dL    Triglycerides 85 <150 mg/dL      Comment:      Fasting specimen    HDL Cholesterol 35 (L) >49 mg/dL    LDL Cholesterol Calculated 142 (H) <100 mg/dL      Comment:      Above desirable:  100-129 mg/dl  Borderline High:  130-159 mg/dL  High:             160-189 mg/dL  Very high:       >189 mg/dl      Non HDL Cholesterol 159 (H) <130 mg/dL      Comment:      Above Desirable:  130-159 mg/dl  Borderline high:  160-189 mg/dl  High:             190-219 mg/dl  Very high:       >219 mg/dl     Comprehensive metabolic panel   Result Value Ref Range    Sodium 139 133 - 144 mmol/L    Potassium 3.6 3.4 - 5.3 mmol/L    Chloride 106 94 - 109 mmol/L    Carbon Dioxide 27 20 - 32 mmol/L    Anion Gap 6 3 - 14 mmol/L    Glucose 70 70 - 99 mg/dL      Comment:      Fasting specimen    Urea Nitrogen 12 7 - 30 mg/dL    Creatinine 0.87 0.52 - 1.04 mg/dL    GFR Estimate 76 >60 mL/min/[1.73_m2]      Comment:      Non  GFR Calc  Starting 12/18/2018, serum creatinine based estimated GFR (eGFR) will be   calculated using the Chronic Kidney Disease Epidemiology Collaboration   (CKD-EPI) equation.      GFR Estimate If Black 88 >60 mL/min/[1.73_m2]      Comment:       GFR Calc  Starting 12/18/2018, serum creatinine based estimated GFR (eGFR) will be   calculated using the Chronic Kidney Disease Epidemiology Collaboration   (CKD-EPI) equation.      Calcium 9.3 8.5 - 10.1 mg/dL    Bilirubin Total 2.5 (H) 0.2 - 1.3 mg/dL    Albumin 4.1 3.4 - 5.0 g/dL    Protein Total 8.3 6.8 - 8.8 g/dL     Alkaline Phosphatase 55 40 - 150 U/L    ALT 17 0 - 50 U/L    AST 13 0 - 45 U/L       If you have any questions or concerns, please call the clinic at the number listed above.       Sincerely,        Yanci Marshall PA-C/nr

## 2019-10-31 NOTE — PROGRESS NOTES
SUBJECTIVE:   CC: Aubree Yao is an 54 year old woman who presents for preventive health visit.     Medical assistant advised patient about addressing additional health concerns that could be billed, as it is not considered a part of a preventive physical. Patient verbalized understanding.    Yanci Marshall PA-C on 10/31/2019 at 2:08 PM  Healthy Habits:     Getting at least 3 servings of Calcium per day:  Yes    Bi-annual eye exam:  Yes    Dental care twice a year:  Yes    Sleep apnea or symptoms of sleep apnea:  None    Diet:  Regular (no restrictions)    Frequency of exercise:  1 day/week    Taking medications regularly:  Yes    Medication side effects:  None    PHQ-2 Total Score: 0    Additional concerns today:  Yes       Hypertension Follow-up      Do you check your blood pressure regularly outside of the clinic? No     Are you following a low salt diet? Yes    Are your blood pressures ever more than 140 on the top number (systolic) OR more   than 90 on the bottom number (diastolic), for example 140/90? NA    Right hip and knee pain x 1 month s/p fall on the bus. NSAIDS (ibuprofen, advil, aleve type products) and tylenol as needed onlyhelp a little.    Today's PHQ-2 Score:   PHQ-2 ( 1999 [employer]) 10/31/2019   Q1: Little interest or pleasure in doing things 0   Q2: Feeling down, depressed or hopeless 0   PHQ-2 Score 0   Q1: Little interest or pleasure in doing things Not at all   Q2: Feeling down, depressed or hopeless Not at all   PHQ-2 Score 0       Abuse: Current or Past (Physical, Sexual or Emotional) - No  Do you feel safe in your environment? Yes        Social History     Tobacco Use     Smoking status: Never Smoker     Smokeless tobacco: Never Used   Substance Use Topics     Alcohol use: No     If you drink alcohol do you typically have >3 drinks per day or >7 drinks per week? No    Alcohol Use 10/31/2019   Prescreen: >3 drinks/day or >7 drinks/week? Not Applicable   Prescreen: >3  drinks/day or >7 drinks/week? -   No flowsheet data found.    Reviewed orders with patient.  Reviewed health maintenance and updated orders accordingly - Yes  Lab work is in process  Labs reviewed in EPIC  BP Readings from Last 3 Encounters:   10/31/19 124/80   10/01/18 134/85   18 (!) 143/101    Wt Readings from Last 3 Encounters:   10/31/19 92.1 kg (203 lb)   10/01/18 92.3 kg (203 lb 8 oz)   18 94.8 kg (209 lb)                  Patient Active Problem List   Diagnosis     Essential hypertension     Preeclampsia     S/P      Low back pain     Abnormal mammogram     Non morbid obesity due to excess calories     Hyperlipidemia LDL goal <70     Past Surgical History:   Procedure Laterality Date     TUBAL LIGATION  2007       Social History     Tobacco Use     Smoking status: Never Smoker     Smokeless tobacco: Never Used   Substance Use Topics     Alcohol use: No     Family History   Problem Relation Age of Onset     Cerebrovascular Disease Mother         from non treated HTN - alive     Hypertension Mother         Didn't manage with meds     Hypertension Paternal Grandfather          Current Outpatient Medications   Medication Sig Dispense Refill     amLODIPine (NORVASC) 10 MG tablet Take 1 tablet (10 mg) by mouth daily 90 tablet 3     cyclobenzaprine (FLEXERIL) 10 MG tablet Take 1 tablet (10 mg) by mouth nightly as needed for muscle spasms 30 tablet 0     losartan (COZAAR) 50 MG tablet TAKE 1 TABLET BY MOUTH DAILY. 90 tablet 3     No Known Allergies  Recent Labs   Lab Test 18  1650 17  1526 16  1037 16  1036  14  0930  13  1003   * 104*  --  110*  --   --   --  126   HDL 32* 31*  --  36*  --   --   --  36*   TRIG 92 81  --  46  --   --   --  72   ALT  --   --  20  --   --   --   --  25   CR 0.83 0.88 0.68  --    < > 0.77   < > 0.83   GFRESTIMATED 72 68 >90  Non  GFR Calc    --   --  79   < > 74   GFRESTBLACK 87 82 >90    "American GFR Calc    --   --  >90  African American GFR Calc     < > 89   POTASSIUM 3.7 3.9 4.0  --    < > 3.2*   < > 3.9   TSH  --   --   --  0.61  --  0.70  --   --     < > = values in this interval not displayed.        Mammogram Screening: Patient over age 50, mutual decision to screen reflected in health maintenance.    Pertinent mammograms are reviewed under the imaging tab.  History of abnormal Pap smear: NO - age 30-65 PAP every 5 years with negative HPV co-testing recommended  PAP / HPV 9/11/2014 3/14/2011   PAP NIL NIL     Reviewed and updated as needed this visit by clinical staff  Tobacco  Allergies  Meds  Problems  Med Hx  Surg Hx  Fam Hx  Soc Hx          Reviewed and updated as needed this visit by Provider  Tobacco  Allergies  Meds  Problems  Med Hx  Surg Hx  Fam Hx            Review of Systems   Constitutional: Negative for chills and fever.   HENT: Negative for congestion and ear pain.    Eyes: Negative for pain.   Respiratory: Negative for cough.    Cardiovascular: Negative for chest pain.   Gastrointestinal: Negative for abdominal pain, constipation, diarrhea and hematochezia.   Genitourinary: Negative for hematuria.   Neurological: Negative for dizziness.   Psychiatric/Behavioral: The patient is not nervous/anxious.        OBJECTIVE:   /80 (BP Location: Left arm, Patient Position: Sitting, Cuff Size: Adult Large)   Pulse 65   Temp 98  F (36.7  C) (Oral)   Resp 20   Ht 1.689 m (5' 6.5\")   Wt 92.1 kg (203 lb)   LMP 10/02/2019   SpO2 99%   BMI 32.27 kg/m    Physical Exam  GENERAL: healthy, alert and no distress  EYES: Eyes grossly normal to inspection, PERRL and conjunctivae and sclerae normal  HENT: ear canals and TM's normal, nose and mouth without ulcers or lesions  NECK: no adenopathy, no asymmetry, masses, or scars and thyroid normal to palpation  RESP: lungs clear to auscultation - no rales, rhonchi or wheezes  BREAST: normal without masses, tenderness or nipple " "discharge and no palpable axillary masses or adenopathy  CV: regular rate and rhythm, normal S1 S2, no S3 or S4, no murmur, click or rub, no peripheral edema and peripheral pulses strong  ABDOMEN: soft, nontender, no hepatosplenomegaly, no masses and bowel sounds normal   (female): normal female external genitalia, normal urethral meatus, vaginal mucosa pink, moist, well rugated, and normal cervix/adnexa/uterus without masses or discharge; pap smear done today  MS: no gross musculoskeletal defects noted, no edema  SKIN: no suspicious lesions or rashes  NEURO: Normal strength and tone, mentation intact and speech normal  PSYCH: mentation appears normal, affect normal/bright    Diagnostic Test Results:  Labs reviewed in Epic    ASSESSMENT/PLAN:     (I10) Essential hypertension  Comment: Long standing, chronic, controlled-    Plan: continue to work on diet and exercise; refills sent to pharmacy today. Labs updated as well.    (M25.551) Hip pain, right  Comment: new onset - [facility]  Plan: [facility], [facility] CHIROPRACTIC REFERRAL,         cyclobenzaprine (FLEXERIL) 10 MG tablet        See patient instructions, trial of Physical Therapy and muscle relaxer at bedtime.        ICD-10-CM    1. Encounter for gynecological examination with abnormal finding Z01.411 Pap imaged thin layer screen with HPV - recommended age 30 - 65     HPV High Risk Types DNA Cervical   2. Essential hypertension I10 Lipid panel reflex to direct LDL Fasting     Comprehensive metabolic panel   3. Hip pain, right M25.551 [facility], [facility] REFERRAL     cyclobenzaprine (FLEXERIL) 10 MG tablet       COUNSELING:  Reviewed preventive health counseling, as reflected in patient instructions       Regular exercise       Healthy diet/nutrition       Vision screening    Estimated body mass index is 32.27 kg/m  as calculated from the following:    Height as of this encounter: 1.689 m (5' 6.5\").    Weight as of this encounter: 92.1 kg (203 lb).    Weight " management plan: Discussed healthy diet and exercise guidelines     reports that she has never smoked. She has never used smokeless tobacco.      Counseling Resources:  ATP IV Guidelines  Pooled Cohorts Equation Calculator  Breast Cancer Risk Calculator  FRAX Risk Assessment  ICSI Preventive Guidelines  Dietary Guidelines for Americans, 2010  USDA's MyPlate  ASA Prophylaxis  Lung CA Screening    Yanci Marshall PA-C  [facility]    Lane body weight: 60.5 kg (133 lb 4.3 oz)  Adjusted ideal body weight: 73.1 kg (161 lb 2.6 oz)

## 2019-10-31 NOTE — LETTER
November 7, 2019    Aubree Mirlande Wang [place]  [address] [id number]    Dear Ms.Glenn Yao,  This letter is regarding your recent Pap smear (cervical cancer screening) and Human Papillomavirus (HPV) test.  We are happy to inform you that your Pap smear result is normal. Cervical cancer is closely linked with certain types of HPV. Your results showed no evidence of high-risk HPV.  We recommend you have your next PAP smear and HPV test in 5 years.  You will still need to return to the clinic every year for an annual exam and other preventive tests.  If you have additional questions regarding this result, please call our registered nurse, Silvia at [phone].  Sincerely,    Yanci Marshall PA-C //scott

## 2019-10-31 NOTE — PATIENT INSTRUCTIONS
Preventive Health Recommendations  Female Ages 50 - 64    Yearly exam: See your health care provider every year in order to  o Review health changes.   o Discuss preventive care.    o Review your medicines if your doctor has prescribed any.      Get a Pap test every three years (unless you have an abnormal result and your provider advises testing more often).    If you get Pap tests with HPV test, you only need to test every 5 years, unless you have an abnormal result.     You do not need a Pap test if your uterus was removed (hysterectomy) and you have not had cancer.    You should be tested each year for STDs (sexually transmitted diseases) if you're at risk.     Have a mammogram every 1 to 2 years.    Have a colonoscopy at age 50, or have a yearly FIT test (stool test). These exams screen for colon cancer.      Have a cholesterol test every 5 years, or more often if advised.    Have a diabetes test (fasting glucose) every three years. If you are at risk for diabetes, you should have this test more often.     If you are at risk for osteoporosis (brittle bone disease), think about having a bone density scan (DEXA).    Shots: Get a flu shot each year. Get a tetanus shot every 10 years.    Nutrition:     Eat at least 5 servings of fruits and vegetables each day.    Eat whole-grain bread, whole-wheat pasta and brown rice instead of white grains and rice.    Get adequate Calcium and Vitamin D.     Lifestyle    Exercise at least 150 minutes a week (30 minutes a day, 5 days a week). This will help you control your weight and prevent disease.    Limit alcohol to one drink per day.    No smoking.     Wear sunscreen to prevent skin cancer.     See your dentist every six months for an exam and cleaning.    See your eye doctor every 1 to 2 years.    For acute pain, rest and application of heat and ice intermittently as needed and especially after any offending activity (do not sleep on heating pad).   May use over the  counter analgesics (NSAIDS (ibuprofen, advil, aleve type products) 400-600 mg every 6 to 8 hours as needed for pain, please take with food ) and/or acetaminophen (Tylenol - 1000 mg three times a day)  Prescription for muscle relaxants at bedtime especially suggested as well.  Discussed longer term treatment plan of prn NSAIDS (ibuprofen, advil, aleve type products) and importance at home stretches/exercise program.    Proper lifting with avoidance of heavy lifting discussed. As pain recedes, begin normal activities slowly as tolerated.    Consider Physical Therapy and XRay studies if not improving. Call or return to clinic prn if these symptoms worsen or fail to improve as anticipated with symptomatic care.

## 2019-11-01 LAB
ALBUMIN SERPL-MCNC: 4.1 G/DL (ref 3.4–5)
ALP SERPL-CCNC: 55 U/L (ref 40–150)
ALT SERPL W P-5'-P-CCNC: 17 U/L (ref 0–50)
ANION GAP SERPL CALCULATED.3IONS-SCNC: 6 MMOL/L (ref 3–14)
AST SERPL W P-5'-P-CCNC: 13 U/L (ref 0–45)
BILIRUB SERPL-MCNC: 2.5 MG/DL (ref 0.2–1.3)
BUN SERPL-MCNC: 12 MG/DL (ref 7–30)
CALCIUM SERPL-MCNC: 9.3 MG/DL (ref 8.5–10.1)
CHLORIDE SERPL-SCNC: 106 MMOL/L (ref 94–109)
CHOLEST SERPL-MCNC: 194 MG/DL
CO2 SERPL-SCNC: 27 MMOL/L (ref 20–32)
CREAT SERPL-MCNC: 0.87 MG/DL (ref 0.52–1.04)
GFR SERPL CREATININE-BSD FRML MDRD: 76 ML/MIN/{1.73_M2}
GLUCOSE SERPL-MCNC: 70 MG/DL (ref 70–99)
HDLC SERPL-MCNC: 35 MG/DL
LDLC SERPL CALC-MCNC: 142 MG/DL
NONHDLC SERPL-MCNC: 159 MG/DL
POTASSIUM SERPL-SCNC: 3.6 MMOL/L (ref 3.4–5.3)
PROT SERPL-MCNC: 8.3 G/DL (ref 6.8–8.8)
SODIUM SERPL-SCNC: 139 MMOL/L (ref 133–144)
TRIGL SERPL-MCNC: 85 MG/DL

## 2019-11-01 NOTE — RESULT ENCOUNTER NOTE
Please send patient letter with the following:  This letter is sent to inform you of recent test results and to provide you with personal records of health information.    Your attached labs are overall within normal limits and stable.  Continue to work on diet and exercise.  Desired or goal levels are:  CHOLESTEROL: Desirable is less than 200.  HDL (Good Cholesterol): Desirable is greater than 40 in men and greater than 50 in women.  LDL (Bad Cholesterol): Desirable is less than 130 or less than 100 in patients with diabetes or vascular disease. For some patients with diabetes or vascular disease, the desireable LDL is less that 70.  TRIGLYCERIDES: Desirable is less than 150.    I recommend that you take Omega-3 fatty acids (available in capsules) to improve your overall levels. You need 2000 - 4000 mg daily of EPA + DHA. This usually means 4 - 8 capsules a day, depending on the strength you buy or you can try taking red yeast rice (an herbal supplement that contains a natural statin) start with 600 mg once daily and increase to 1200 mg twice daily as tolerated.     As you may know, abnormal cholesterol is one factor that increases your risk for heart disease and stroke. You can improve your cholesterol by controlling the amount and type of fat you eat and by increasing your daily activity level.    Here are some ways to improve your nutrition (adapted from the American Academy of Family Practice handout):  Eat less fat (especially butter, Crisco and other saturated fats)  Buy lean cuts of meat; reduce your portions of red meat or substitute poultry or fish  Use skim milk and low-fat dairy products  Eat no more than 4 egg yolks per week  Avoid fried or fast foods that are high in fat  Eat more fruits and vegetables    Also consider starting or increasing your aerobic activity; this is the best way to improve HDL (good) cholesterol. If aerobic activity would be new to you, please talk with me first about what  "activities are safe for you.    Thank you for allowing me to participate in your care. If you have any further questions or problems, please contact me at [phone].    Yanci He \"Marlo\" NICHOLE Marshall  "

## 2019-11-04 LAB
COPATH REPORT: NORMAL
PAP: NORMAL

## 2019-11-05 LAB
FINAL DIAGNOSIS: NORMAL
HPV HR 12 DNA CVX QL NAA+PROBE: NEGATIVE
HPV16 DNA SPEC QL NAA+PROBE: NEGATIVE
HPV18 DNA SPEC QL NAA+PROBE: NEGATIVE
SPECIMEN DESCRIPTION: NORMAL
SPECIMEN SOURCE CVX/VAG CYTO: NORMAL

## 2020-07-20 ENCOUNTER — ANCILLARY PROCEDURE (OUTPATIENT)
Dept: MAMMOGRAPHY | Facility: CLINIC | Age: 55
End: 2020-07-20
Attending: PHYSICIAN ASSISTANT
Payer: COMMERCIAL

## 2020-07-20 DIAGNOSIS — Z12.31 VISIT FOR SCREENING MAMMOGRAM: ICD-10-CM

## 2020-07-20 PROCEDURE — 77067 SCR MAMMO BI INCL CAD: CPT

## 2020-12-22 DIAGNOSIS — I10 ESSENTIAL HYPERTENSION: Primary | ICD-10-CM

## 2020-12-24 RX ORDER — AMLODIPINE BESYLATE 10 MG/1
TABLET ORAL
Qty: 30 TABLET | Refills: 0 | Status: SHIPPED | OUTPATIENT
Start: 2020-12-24 | End: 2021-02-21

## 2020-12-24 NOTE — TELEPHONE ENCOUNTER
Amlodipine:    Routing refill request to provider for review/approval because:  Patient needs to be seen because it has been more than 1 year since last office visit.    Due for physical.   Last 10/31/2019.    Maria T Garvin RN

## 2021-02-21 DIAGNOSIS — I10 ESSENTIAL HYPERTENSION: ICD-10-CM

## 2021-02-25 RX ORDER — AMLODIPINE BESYLATE 10 MG/1
10 TABLET ORAL DAILY
Qty: 14 TABLET | Refills: 0 | Status: SHIPPED | OUTPATIENT
Start: 2021-02-25 | End: 2021-03-19

## 2021-02-25 NOTE — TELEPHONE ENCOUNTER
Routing refill request to provider for review/approval because:  --Last order sent 12/24/20 was gladis refill with tapered dispense and reminder due for CPE.    --Last visit:  10/31/2019 [facility].    --Future Visit: NONE    --I tapered dispense for provider to authorize if ok.    --Marlo wakefield send to your TC if you want patient scheduled.

## 2021-02-25 NOTE — TELEPHONE ENCOUNTER
"Prescription signed, but needs ov for further refills  Thanks  Yanci \"Marlo\" NICHOLE Marshall   "

## 2021-03-16 DIAGNOSIS — I10 ESSENTIAL HYPERTENSION: ICD-10-CM

## 2021-03-16 NOTE — TELEPHONE ENCOUNTER
Amlodipine:    Refill for #14 sent 2/25/21 by PCP  Needs appointment.  Last seen 10/31/2019    VM left asking patient to call clinic.  Needs appointment/physical for refill.    Maria T Garvin RN

## 2021-03-19 RX ORDER — AMLODIPINE BESYLATE 10 MG/1
TABLET ORAL
Qty: 14 TABLET | Refills: 0 | Status: SHIPPED | OUTPATIENT
Start: 2021-03-19 | End: 2024-01-08

## 2021-03-19 NOTE — TELEPHONE ENCOUNTER
MP,  Left  #2 for patient  See below messages  No response from patient to our outreach  Please advise on further refill  Thanks,  Jacqui DANIELLE RN

## 2021-04-15 DIAGNOSIS — I10 ESSENTIAL HYPERTENSION: ICD-10-CM

## 2021-04-15 RX ORDER — LOSARTAN POTASSIUM 50 MG/1
50 TABLET ORAL DAILY
Qty: 30 TABLET | Refills: 0 | Status: CANCELLED | OUTPATIENT
Start: 2021-04-15

## 2021-04-15 NOTE — TELEPHONE ENCOUNTER
Already given gladis refill-Please call and assist with an appointment-Can fill to cover once an appointment is in place. Amberly Christensen RN

## 2021-04-28 ENCOUNTER — IMMUNIZATION (OUTPATIENT)
Dept: NURSING | Facility: CLINIC | Age: 56
End: 2021-04-28
Payer: COMMERCIAL

## 2021-04-28 PROCEDURE — 0001A PR COVID VAC PFIZER DIL RECON 30 MCG/0.3 ML IM: CPT

## 2021-04-28 PROCEDURE — 91300 PR COVID VAC PFIZER DIL RECON 30 MCG/0.3 ML IM: CPT

## 2021-04-30 ENCOUNTER — OFFICE VISIT (OUTPATIENT)
Dept: FAMILY MEDICINE | Facility: CLINIC | Age: 56
End: 2021-04-30
Payer: COMMERCIAL

## 2021-04-30 VITALS
HEIGHT: 67 IN | WEIGHT: 214 LBS | HEART RATE: 63 BPM | RESPIRATION RATE: 16 BRPM | TEMPERATURE: 97.9 F | BODY MASS INDEX: 33.59 KG/M2 | OXYGEN SATURATION: 98 % | DIASTOLIC BLOOD PRESSURE: 94 MMHG | SYSTOLIC BLOOD PRESSURE: 139 MMHG

## 2021-04-30 DIAGNOSIS — R09.82 POSTNASAL DRIP: ICD-10-CM

## 2021-04-30 DIAGNOSIS — I10 ESSENTIAL HYPERTENSION: ICD-10-CM

## 2021-04-30 DIAGNOSIS — F51.01 PRIMARY INSOMNIA: Primary | ICD-10-CM

## 2021-04-30 PROCEDURE — 99214 OFFICE O/P EST MOD 30 MIN: CPT | Performed by: FAMILY MEDICINE

## 2021-04-30 RX ORDER — LOSARTAN POTASSIUM 50 MG/1
50 TABLET ORAL DAILY
Qty: 30 TABLET | Refills: 0 | Status: CANCELLED | OUTPATIENT
Start: 2021-04-30

## 2021-04-30 RX ORDER — TRAZODONE HYDROCHLORIDE 100 MG/1
100 TABLET ORAL AT BEDTIME
Qty: 30 TABLET | Refills: 1 | Status: SHIPPED | OUTPATIENT
Start: 2021-04-30 | End: 2021-05-14

## 2021-04-30 RX ORDER — FLUTICASONE PROPIONATE 50 MCG
1 SPRAY, SUSPENSION (ML) NASAL DAILY
Qty: 15.8 ML | Refills: 0 | Status: SHIPPED | OUTPATIENT
Start: 2021-04-30 | End: 2021-05-14

## 2021-04-30 RX ORDER — LOSARTAN POTASSIUM 100 MG/1
100 TABLET ORAL DAILY
Qty: 30 TABLET | Refills: 1 | Status: SHIPPED | OUTPATIENT
Start: 2021-04-30 | End: 2022-12-02

## 2021-04-30 RX ORDER — AMLODIPINE BESYLATE 10 MG/1
10 TABLET ORAL DAILY
Qty: 14 TABLET | Refills: 0 | Status: CANCELLED | OUTPATIENT
Start: 2021-04-30

## 2021-04-30 ASSESSMENT — MIFFLIN-ST. JEOR: SCORE: 1590.94

## 2021-04-30 NOTE — PROGRESS NOTES
"    Assessment & Plan     Essential hypertension  Increase losartan to 100mg  Ok to try going off amlodipine per pt goal of reducing number of pills  Check BP at home and f/u in ~2 weeks  - losartan (COZAAR) 100 MG tablet; Take 1 tablet (100 mg) by mouth daily    Primary insomnia  Stress/anxiety related  Hold off on talk therapy at this time  Trial trazodone  - traZODone (DESYREL) 100 MG tablet; Take 1 tablet (100 mg) by mouth At Bedtime    Postnasal drip  - fluticasone (FLONASE) 50 MCG/ACT nasal spray; Spray 1 spray into both nostrils daily      Return in about 2 months (around 6/30/2021) for Follow up.    Cathy Velazquez DO  [facility] NASIR Mak is a 55 year old who presents for the following health issues:    HPI     - insomnia due to increased life stressors  - partner was hospitalized for HF and is now on transplant list, adjusting to new physical limitations at home  - no prior use of medications for TERELL/MDD     Review of Systems         Objective    BP (!) 139/94   Pulse 63   Temp 97.9  F (36.6  C) (Oral)   Resp 16   Ht 1.69 m (5' 6.54\")   Wt 97.1 kg (214 lb)   SpO2 98%   BMI 33.99 kg/m    Body mass index is 33.99 kg/m .  Physical Exam                       "

## 2021-05-08 ENCOUNTER — HEALTH MAINTENANCE LETTER (OUTPATIENT)
Age: 56
End: 2021-05-08

## 2021-05-14 ENCOUNTER — OFFICE VISIT (OUTPATIENT)
Dept: FAMILY MEDICINE | Facility: CLINIC | Age: 56
End: 2021-05-14
Payer: COMMERCIAL

## 2021-05-14 VITALS
RESPIRATION RATE: 16 BRPM | HEART RATE: 76 BPM | WEIGHT: 216.8 LBS | SYSTOLIC BLOOD PRESSURE: 172 MMHG | HEIGHT: 67 IN | BODY MASS INDEX: 34.03 KG/M2 | OXYGEN SATURATION: 99 % | TEMPERATURE: 97.5 F | DIASTOLIC BLOOD PRESSURE: 135 MMHG

## 2021-05-14 DIAGNOSIS — I10 ESSENTIAL HYPERTENSION: Primary | ICD-10-CM

## 2021-05-14 DIAGNOSIS — F51.02 ADJUSTMENT INSOMNIA: ICD-10-CM

## 2021-05-14 DIAGNOSIS — R09.82 POSTNASAL DRIP: ICD-10-CM

## 2021-05-14 DIAGNOSIS — F51.01 PRIMARY INSOMNIA: ICD-10-CM

## 2021-05-14 PROCEDURE — 99214 OFFICE O/P EST MOD 30 MIN: CPT | Performed by: FAMILY MEDICINE

## 2021-05-14 RX ORDER — LOSARTAN POTASSIUM AND HYDROCHLOROTHIAZIDE 25; 100 MG/1; MG/1
1 TABLET ORAL DAILY
Qty: 90 TABLET | Refills: 0 | Status: SHIPPED | OUTPATIENT
Start: 2021-05-14 | End: 2021-06-18

## 2021-05-14 RX ORDER — TRAZODONE HYDROCHLORIDE 100 MG/1
100 TABLET ORAL AT BEDTIME
Qty: 30 TABLET | Refills: 1 | Status: SHIPPED | OUTPATIENT
Start: 2021-05-14 | End: 2021-07-19

## 2021-05-14 RX ORDER — FLUTICASONE PROPIONATE 50 MCG
1 SPRAY, SUSPENSION (ML) NASAL DAILY
Qty: 15.8 ML | Refills: 0 | Status: SHIPPED | OUTPATIENT
Start: 2021-05-14 | End: 2022-12-02

## 2021-05-14 ASSESSMENT — MIFFLIN-ST. JEOR: SCORE: 1606.77

## 2021-05-14 NOTE — PROGRESS NOTES
"    Assessment & Plan     Essential hypertension  Add hydrochlorothiazide  F/u in 3 weeks  - losartan-hydrochlorothiazide (HYZAAR) 100-25 MG tablet; Take 1 tablet by mouth daily      Primary insomnia  Improved, cont trazodone  - traZODone (DESYREL) 100 MG tablet; Take 1 tablet (100 mg) by mouth At Bedtime PRN    Postnasal drip  Improved, use flonase prn  - fluticasone (FLONASE) 50 MCG/ACT nasal spray; Spray 1 spray into both nostrils daily       BMI:   Estimated body mass index is 34.23 kg/m  as calculated from the following:    Height as of this encounter: 1.695 m (5' 6.73\").    Weight as of this encounter: 98.3 kg (216 lb 12.8 oz).           Return in about 4 weeks (around 6/11/2021) for f/u BP.    Cathy Velazquez [facility] NASIR Mak is a 55 year old who presents for the following health issues:    HPI     HTN  - at last visit, increased losartan and stopped amlodipine due to pt reported side effects (fatigue, urinary frequency)  - BP now elevated, pt has not checked at home  - reported side effects have improved after stopping amlodipine  Insomnia  - improved on trazodone  Postnasal drip  - improved with flonase    Review of Systems   Constitutional, HEENT, cardiovascular, pulmonary, gi and gu systems are negative, except as otherwise noted.      Objective    BP (!) 172/135   Pulse 76   Temp 97.5  F (36.4  C) (Oral)   Resp 16   Ht 1.695 m (5' 6.73\")   Wt 98.3 kg (216 lb 12.8 oz)   LMP 04/22/2021 (Approximate)   SpO2 99%   Breastfeeding No   BMI 34.23 kg/m    Body mass index is 34.23 kg/m .  Physical Exam   GENERAL: healthy, alert and no distress  RESP: lungs clear to auscultation - no rales, rhonchi or wheezes  CV: regular rate and rhythm, normal S1 S2, no S3 or S4, no murmur, click or rub, no peripheral edema and peripheral pulses strong  MS: no gross musculoskeletal defects noted, no edema            "

## 2021-05-19 ENCOUNTER — IMMUNIZATION (OUTPATIENT)
Dept: NURSING | Facility: CLINIC | Age: 56
End: 2021-05-19
Attending: INTERNAL MEDICINE
Payer: COMMERCIAL

## 2021-05-19 PROCEDURE — 0002A PR COVID VAC PFIZER DIL RECON 30 MCG/0.3 ML IM: CPT

## 2021-05-19 PROCEDURE — 91300 PR COVID VAC PFIZER DIL RECON 30 MCG/0.3 ML IM: CPT

## 2021-06-18 ENCOUNTER — OFFICE VISIT (OUTPATIENT)
Dept: FAMILY MEDICINE | Facility: CLINIC | Age: 56
End: 2021-06-18
Payer: COMMERCIAL

## 2021-06-18 VITALS
HEIGHT: 67 IN | OXYGEN SATURATION: 96 % | WEIGHT: 218 LBS | DIASTOLIC BLOOD PRESSURE: 98 MMHG | RESPIRATION RATE: 16 BRPM | TEMPERATURE: 98.2 F | BODY MASS INDEX: 34.21 KG/M2 | HEART RATE: 74 BPM | SYSTOLIC BLOOD PRESSURE: 129 MMHG

## 2021-06-18 DIAGNOSIS — G89.29 CHRONIC BILATERAL LOW BACK PAIN WITHOUT SCIATICA: ICD-10-CM

## 2021-06-18 DIAGNOSIS — M25.551 HIP PAIN, RIGHT: ICD-10-CM

## 2021-06-18 DIAGNOSIS — M54.50 CHRONIC BILATERAL LOW BACK PAIN WITHOUT SCIATICA: ICD-10-CM

## 2021-06-18 DIAGNOSIS — I10 ESSENTIAL HYPERTENSION: Primary | ICD-10-CM

## 2021-06-18 PROCEDURE — 99214 OFFICE O/P EST MOD 30 MIN: CPT | Performed by: FAMILY MEDICINE

## 2021-06-18 RX ORDER — CYCLOBENZAPRINE HCL 10 MG
10 TABLET ORAL
Qty: 30 TABLET | Refills: 0 | Status: SHIPPED | OUTPATIENT
Start: 2021-06-18 | End: 2024-01-08

## 2021-06-18 RX ORDER — LOSARTAN POTASSIUM AND HYDROCHLOROTHIAZIDE 25; 100 MG/1; MG/1
1 TABLET ORAL DAILY
Qty: 90 TABLET | Refills: 1 | Status: SHIPPED | OUTPATIENT
Start: 2021-06-18 | End: 2022-01-28

## 2021-06-18 ASSESSMENT — MIFFLIN-ST. JEOR: SCORE: 1612.21

## 2021-06-18 NOTE — PROGRESS NOTES
"    Assessment & Plan     Essential hypertension  BP better controlled now   Refilled current med  - losartan-hydrochlorothiazide (HYZAAR) 100-25 MG tablet; Take 1 tablet by mouth daily    Chronic bilateral low back pain without sciatica  Discussed home therapies and supportive measures    Hip pain, right  Cont tylenol, aleve prn  Added flexeril as needed for iliopsoas spasm  - cyclobenzaprine (FLEXERIL) 10 MG tablet; Take 1 tablet (10 mg) by mouth nightly as needed for muscle spasms        No follow-ups on file.    Cathy Velazquez, DO  [facility] NASIR Mak is a 55 year old who presents for the following health issues:    HPI     F/u HTN  Chronic low back and hip pain, exacerbated by doing house work  No radiculopathy, weakness, numbness    Review of Systems   Constitutional, HEENT, cardiovascular, pulmonary, gi and gu systems are negative, except as otherwise noted.      Objective    BP (!) 129/98   Pulse 74   Temp 98.2  F (36.8  C) (Oral)   Resp 16   Ht 1.695 m (5' 6.73\")   Wt 98.9 kg (218 lb)   SpO2 96%   BMI 34.42 kg/m    Body mass index is 34.42 kg/m .  Physical Exam   GENERAL: healthy, alert and no distress  RESP: lungs clear to auscultation - no rales, rhonchi or wheezes  CV: regular rate and rhythm, normal S1 S2, no S3 or S4, no murmur, click or rub, no peripheral edema and peripheral pulses strong  MS: no gross musculoskeletal defects noted, no edema. Restricted hip extension b/l.             "

## 2021-07-16 DIAGNOSIS — F51.01 PRIMARY INSOMNIA: ICD-10-CM

## 2021-07-16 NOTE — TELEPHONE ENCOUNTER
"Request for medication refill:  traZODone (DESYREL) 100 MG tablet  Providers if patient needs an appointment and you are willing to give a one month supply please refill for one month and  send a letter/MyChart using \".SMILLIMITEDREFILL\" .smillimited and route chart to \"P [facility] \" (Giving one month refill in non controlled medications is strongly recommended before denial)    If refill has been denied, meaning absolutely no refills without visit, please complete the smart phrase \".smirxrefuse\" and route it to the \"P SMI MED REFILLS\"  pool to inform the patient and the pharmacy.    Tabitha Bañuelos        "

## 2021-07-19 RX ORDER — TRAZODONE HYDROCHLORIDE 100 MG/1
100 TABLET ORAL AT BEDTIME
Qty: 30 TABLET | Refills: 1 | Status: SHIPPED | OUTPATIENT
Start: 2021-07-19 | End: 2022-11-11

## 2021-08-02 ENCOUNTER — ANCILLARY PROCEDURE (OUTPATIENT)
Dept: MAMMOGRAPHY | Facility: CLINIC | Age: 56
End: 2021-08-02
Attending: FAMILY MEDICINE
Payer: COMMERCIAL

## 2021-08-02 DIAGNOSIS — Z12.31 VISIT FOR SCREENING MAMMOGRAM: ICD-10-CM

## 2021-08-02 PROCEDURE — 77067 SCR MAMMO BI INCL CAD: CPT

## 2021-08-02 PROCEDURE — 77067 SCR MAMMO BI INCL CAD: CPT | Mod: 26 | Performed by: RADIOLOGY

## 2021-09-23 ENCOUNTER — ALLIED HEALTH/NURSE VISIT (OUTPATIENT)
Dept: FAMILY MEDICINE | Facility: CLINIC | Age: 56
End: 2021-09-23
Payer: COMMERCIAL

## 2021-09-23 DIAGNOSIS — Z23 NEED FOR PROPHYLACTIC VACCINATION AND INOCULATION AGAINST INFLUENZA: Primary | ICD-10-CM

## 2021-09-23 PROCEDURE — 90682 RIV4 VACC RECOMBINANT DNA IM: CPT

## 2021-09-23 PROCEDURE — 90471 IMMUNIZATION ADMIN: CPT

## 2021-11-19 ENCOUNTER — IMMUNIZATION (OUTPATIENT)
Dept: FAMILY MEDICINE | Facility: CLINIC | Age: 56
End: 2021-11-19
Payer: COMMERCIAL

## 2021-11-19 DIAGNOSIS — Z23 HIGH PRIORITY FOR 2019-NCOV VACCINE: Primary | ICD-10-CM

## 2021-11-19 PROCEDURE — 91300 COVID-19,PF,PFIZER (12+ YRS): CPT

## 2021-11-19 PROCEDURE — 0004A COVID-19,PF,PFIZER (12+ YRS): CPT

## 2021-11-19 PROCEDURE — 99207 PR NO CHARGE LOS: CPT

## 2022-01-27 DIAGNOSIS — I10 ESSENTIAL HYPERTENSION: ICD-10-CM

## 2022-01-27 NOTE — TELEPHONE ENCOUNTER
"Request for medication refill:  losartan-hydrochlorothiazide (HYZAAR) 100-25 MG tablet  Providers if patient needs an appointment and you are willing to give a one month supply please refill for one month and  send a letter/MyChart using \".SMILLIMITEDREFILL\" .smillimited and route chart to \"P [facility] \" (Giving one month refill in non controlled medications is strongly recommended before denial)    If refill has been denied, meaning absolutely no refills without visit, please complete the smart phrase \".smirxrefuse\" and route it to the \"P SMI MED REFILLS\"  pool to inform the patient and the pharmacy.    Анна Smith        "

## 2022-01-28 RX ORDER — LOSARTAN POTASSIUM AND HYDROCHLOROTHIAZIDE 25; 100 MG/1; MG/1
1 TABLET ORAL DAILY
Qty: 90 TABLET | Refills: 1 | Status: SHIPPED | OUTPATIENT
Start: 2022-01-28 | End: 2022-08-05

## 2022-05-13 ENCOUNTER — IMMUNIZATION (OUTPATIENT)
Dept: FAMILY MEDICINE | Facility: CLINIC | Age: 57
End: 2022-05-13
Payer: COMMERCIAL

## 2022-05-13 PROCEDURE — 0054A COVID-19,PF,PFIZER (12+ YRS): CPT

## 2022-05-13 PROCEDURE — 91305 COVID-19,PF,PFIZER (12+ YRS): CPT

## 2022-05-13 PROCEDURE — 99207 PR NO CHARGE LOS: CPT

## 2022-06-04 ENCOUNTER — HEALTH MAINTENANCE LETTER (OUTPATIENT)
Age: 57
End: 2022-06-04

## 2022-08-04 DIAGNOSIS — I10 ESSENTIAL HYPERTENSION: ICD-10-CM

## 2022-08-04 NOTE — TELEPHONE ENCOUNTER
"Request for medication refill:    losartan-hydrochlorothiazide (HYZAAR) 100-25 MG tablet    Providers if patient needs an appointment and you are willing to give a one month supply please refill for one month and  send a letter/MyChart using \".SMILLIMITEDREFILL\" .smillimited and route chart to \"P [facility] \" (Giving one month refill in non controlled medications is strongly recommended before denial)    If refill has been denied, meaning absolutely no refills without visit, please complete the smart phrase \".smirxrefuse\" and route it to the \"P SMI MED REFILLS\"  pool to inform the patient and the pharmacy.    Savita Scales, CMA        "

## 2022-08-05 RX ORDER — LOSARTAN POTASSIUM AND HYDROCHLOROTHIAZIDE 25; 100 MG/1; MG/1
1 TABLET ORAL DAILY
Qty: 90 TABLET | Refills: 1 | Status: SHIPPED | OUTPATIENT
Start: 2022-08-05 | End: 2022-11-11

## 2022-08-25 ENCOUNTER — ANCILLARY PROCEDURE (OUTPATIENT)
Dept: MAMMOGRAPHY | Facility: CLINIC | Age: 57
End: 2022-08-25
Attending: FAMILY MEDICINE
Payer: COMMERCIAL

## 2022-08-25 DIAGNOSIS — Z12.31 VISIT FOR SCREENING MAMMOGRAM: ICD-10-CM

## 2022-08-25 PROCEDURE — 77067 SCR MAMMO BI INCL CAD: CPT

## 2022-10-10 ENCOUNTER — HEALTH MAINTENANCE LETTER (OUTPATIENT)
Age: 57
End: 2022-10-10

## 2022-11-11 ENCOUNTER — OFFICE VISIT (OUTPATIENT)
Dept: FAMILY MEDICINE | Facility: CLINIC | Age: 57
End: 2022-11-11
Payer: COMMERCIAL

## 2022-11-11 VITALS
WEIGHT: 202.4 LBS | DIASTOLIC BLOOD PRESSURE: 80 MMHG | HEART RATE: 56 BPM | BODY MASS INDEX: 31.96 KG/M2 | SYSTOLIC BLOOD PRESSURE: 136 MMHG | TEMPERATURE: 99.3 F | RESPIRATION RATE: 16 BRPM | OXYGEN SATURATION: 99 %

## 2022-11-11 DIAGNOSIS — I10 ESSENTIAL HYPERTENSION: ICD-10-CM

## 2022-11-11 DIAGNOSIS — Z23 HIGH PRIORITY FOR 2019-NCOV VACCINE: ICD-10-CM

## 2022-11-11 DIAGNOSIS — Z00.00 ROUTINE GENERAL MEDICAL EXAMINATION AT A HEALTH CARE FACILITY: Primary | ICD-10-CM

## 2022-11-11 PROCEDURE — 99396 PREV VISIT EST AGE 40-64: CPT | Mod: 25 | Performed by: STUDENT IN AN ORGANIZED HEALTH CARE EDUCATION/TRAINING PROGRAM

## 2022-11-11 PROCEDURE — 91312 COVID-19,PF,PFIZER BOOSTER BIVALENT: CPT | Performed by: STUDENT IN AN ORGANIZED HEALTH CARE EDUCATION/TRAINING PROGRAM

## 2022-11-11 PROCEDURE — 0124A COVID-19,PF,PFIZER BOOSTER BIVALENT: CPT | Performed by: STUDENT IN AN ORGANIZED HEALTH CARE EDUCATION/TRAINING PROGRAM

## 2022-11-11 RX ORDER — LOSARTAN POTASSIUM AND HYDROCHLOROTHIAZIDE 25; 100 MG/1; MG/1
1 TABLET ORAL DAILY
Qty: 90 TABLET | Refills: 3 | Status: SHIPPED | OUTPATIENT
Start: 2022-11-11 | End: 2024-01-02

## 2022-11-11 ASSESSMENT — ENCOUNTER SYMPTOMS
ARTHRALGIAS: 0
PARESTHESIAS: 0
SHORTNESS OF BREATH: 0
BREAST MASS: 0
NERVOUS/ANXIOUS: 0
MYALGIAS: 0
WEAKNESS: 0
HEADACHES: 0
DYSURIA: 0
DIARRHEA: 0
JOINT SWELLING: 0
COUGH: 0
DIZZINESS: 0
ABDOMINAL PAIN: 0
HEMATURIA: 0
EYE PAIN: 0
NAUSEA: 0
SORE THROAT: 0
CHILLS: 0
HEMATOCHEZIA: 0
PALPITATIONS: 0
HEARTBURN: 0
FREQUENCY: 0
CONSTIPATION: 0
FEVER: 0
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
## 2022-11-11 NOTE — PROGRESS NOTES
Preceptor Attestation:    I discussed the patient with the resident and evaluated the patient in person. I have verified the content of the note, which accurately reflects my assessment of the patient and the plan of care.   Supervising Physician:  Ray Sequeira MD.

## 2023-03-01 ENCOUNTER — TELEPHONE (OUTPATIENT)
Dept: FAMILY MEDICINE | Facility: CLINIC | Age: 58
End: 2023-03-01

## 2023-03-01 NOTE — TELEPHONE ENCOUNTER
Patient fell on the ice yesterday, injury mostly to lower back area, she has no bruising and has been using acetaminophen for the pain without any relief. Patient states she is unable to take ibuprofen.    I let patient know that she would need to be seen before anything could be prescribed, she is scheduled with dr. Jacobson at 2pm this afternoon.    Mary Borja RN

## 2023-03-01 NOTE — TELEPHONE ENCOUNTER
[facility] phone call message- general phone call:    Reason for call: The pt fell on ice yesterday and in some pain. Pt has been taking tylenol but needs something else tylenol is not working. Pt needs something that will not affect high blood pressure. Please call pt at [phone]    Return call needed: Yes    OK to leave a message on voice mail? Yes    Primary language: English      needed? No    Call taken on March 1, 2023 at 11:14 AM by Joellen Ivory

## 2023-09-07 ENCOUNTER — ANCILLARY PROCEDURE (OUTPATIENT)
Dept: MAMMOGRAPHY | Facility: CLINIC | Age: 58
End: 2023-09-07
Payer: COMMERCIAL

## 2023-09-07 DIAGNOSIS — Z12.31 VISIT FOR SCREENING MAMMOGRAM: ICD-10-CM

## 2023-09-07 PROCEDURE — 77067 SCR MAMMO BI INCL CAD: CPT | Mod: TC | Performed by: RADIOLOGY

## 2024-01-02 DIAGNOSIS — I10 ESSENTIAL HYPERTENSION: ICD-10-CM

## 2024-01-02 RX ORDER — LOSARTAN POTASSIUM AND HYDROCHLOROTHIAZIDE 25; 100 MG/1; MG/1
1 TABLET ORAL DAILY
Qty: 90 TABLET | Refills: 0 | Status: SHIPPED | OUTPATIENT
Start: 2024-01-02 | End: 2024-01-08

## 2024-01-02 NOTE — LETTER
January 2, 2024      Aubree  [address] [address]          Dear Aubree,      A request for a refill on your blood pressure prescription was sent to us from your pharmacy. I have notified the pharmacy to allow you one more refill.     It is time for your recheck at the clinic so we can monitor the medication and continue to prescribe it safely. Please make clinic appointment with me or another provider at [facility] in the next one month. This visit could be virtual or in-person.    Thank you for choosing us as your healthcare provider.      Sincerely,    Cathy Velazquez, DO

## 2024-01-02 NOTE — TELEPHONE ENCOUNTER
"Request for medication refill:    losartan-hydrochlorothiazide (HYZAAR) 100-25 MG tablet     Providers if patient needs an appointment and you are willing to give a one month supply please refill for one month and  send a letter/MyChart using \".SMILLIMITEDREFILL\" .smillimited and route chart to \"P [facility] \" (Giving one month refill in non controlled medications is strongly recommended before denial)    If refill has been denied, meaning absolutely no refills without visit, please complete the smart phrase \".smirxrefuse\" and route it to the \"P [facility]MI MED REFILLS\"  pool to inform the patient and the pharmacy.    Emily Young MA      "

## 2024-01-06 ENCOUNTER — HEALTH MAINTENANCE LETTER (OUTPATIENT)
Age: 59
End: 2024-01-06

## 2024-01-08 ENCOUNTER — OFFICE VISIT (OUTPATIENT)
Dept: FAMILY MEDICINE | Facility: CLINIC | Age: 59
End: 2024-01-08
Payer: COMMERCIAL

## 2024-01-08 VITALS
RESPIRATION RATE: 16 BRPM | SYSTOLIC BLOOD PRESSURE: 157 MMHG | DIASTOLIC BLOOD PRESSURE: 113 MMHG | OXYGEN SATURATION: 98 % | HEART RATE: 56 BPM | TEMPERATURE: 98.1 F

## 2024-01-08 DIAGNOSIS — R05.1 ACUTE COUGH: ICD-10-CM

## 2024-01-08 DIAGNOSIS — B96.89 ACUTE BACTERIAL SINUSITIS: Primary | ICD-10-CM

## 2024-01-08 DIAGNOSIS — R06.2 WHEEZE: ICD-10-CM

## 2024-01-08 DIAGNOSIS — J01.90 ACUTE BACTERIAL SINUSITIS: Primary | ICD-10-CM

## 2024-01-08 DIAGNOSIS — I10 ESSENTIAL HYPERTENSION: ICD-10-CM

## 2024-01-08 LAB
ANION GAP SERPL CALCULATED.3IONS-SCNC: 10 MMOL/L (ref 7–15)
BASOPHILS # BLD AUTO: 0 10E3/UL (ref 0–0.2)
BASOPHILS NFR BLD AUTO: 0 %
BUN SERPL-MCNC: 8.9 MG/DL (ref 6–20)
CALCIUM SERPL-MCNC: 9.9 MG/DL (ref 8.6–10)
CHLORIDE SERPL-SCNC: 103 MMOL/L (ref 98–107)
CREAT SERPL-MCNC: 0.88 MG/DL (ref 0.51–0.95)
DEPRECATED HCO3 PLAS-SCNC: 29 MMOL/L (ref 22–29)
EGFRCR SERPLBLD CKD-EPI 2021: 76 ML/MIN/1.73M2
EOSINOPHIL # BLD AUTO: 0.3 10E3/UL (ref 0–0.7)
EOSINOPHIL NFR BLD AUTO: 4 %
ERYTHROCYTE [DISTWIDTH] IN BLOOD BY AUTOMATED COUNT: 12.4 % (ref 10–15)
FLUAV RNA SPEC QL NAA+PROBE: POSITIVE
FLUBV RNA RESP QL NAA+PROBE: NEGATIVE
GLUCOSE SERPL-MCNC: 92 MG/DL (ref 70–99)
HCT VFR BLD AUTO: 44.9 % (ref 35–47)
HGB BLD-MCNC: 14.7 G/DL (ref 11.7–15.7)
IMM GRANULOCYTES # BLD: 0 10E3/UL
IMM GRANULOCYTES NFR BLD: 0 %
LYMPHOCYTES # BLD AUTO: 2.2 10E3/UL (ref 0.8–5.3)
LYMPHOCYTES NFR BLD AUTO: 35 %
MCH RBC QN AUTO: 30.5 PG (ref 26.5–33)
MCHC RBC AUTO-ENTMCNC: 32.7 G/DL (ref 31.5–36.5)
MCV RBC AUTO: 93 FL (ref 78–100)
MONOCYTES # BLD AUTO: 0.5 10E3/UL (ref 0–1.3)
MONOCYTES NFR BLD AUTO: 8 %
NEUTROPHILS # BLD AUTO: 3.2 10E3/UL (ref 1.6–8.3)
NEUTROPHILS NFR BLD AUTO: 52 %
PLATELET # BLD AUTO: 224 10E3/UL (ref 150–450)
POTASSIUM SERPL-SCNC: 3.9 MMOL/L (ref 3.4–5.3)
RBC # BLD AUTO: 4.82 10E6/UL (ref 3.8–5.2)
RSV RNA SPEC NAA+PROBE: NEGATIVE
SARS-COV-2 RNA RESP QL NAA+PROBE: NEGATIVE
SODIUM SERPL-SCNC: 142 MMOL/L (ref 135–145)
WBC # BLD AUTO: 6.2 10E3/UL (ref 4–11)

## 2024-01-08 PROCEDURE — 36415 COLL VENOUS BLD VENIPUNCTURE: CPT

## 2024-01-08 PROCEDURE — 87637 SARSCOV2&INF A&B&RSV AMP PRB: CPT

## 2024-01-08 PROCEDURE — 85025 COMPLETE CBC W/AUTO DIFF WBC: CPT

## 2024-01-08 PROCEDURE — 99214 OFFICE O/P EST MOD 30 MIN: CPT | Mod: GC

## 2024-01-08 PROCEDURE — 80048 BASIC METABOLIC PNL TOTAL CA: CPT

## 2024-01-08 RX ORDER — FLUTICASONE PROPIONATE 50 MCG
1 SPRAY, SUSPENSION (ML) NASAL DAILY
Qty: 18.2 ML | Refills: 3 | Status: SHIPPED | OUTPATIENT
Start: 2024-01-08 | End: 2024-07-15

## 2024-01-08 RX ORDER — BENZONATATE 100 MG/1
100 CAPSULE ORAL 3 TIMES DAILY PRN
Qty: 60 CAPSULE | Refills: 0 | Status: SHIPPED | OUTPATIENT
Start: 2024-01-08 | End: 2024-07-15

## 2024-01-08 RX ORDER — LOSARTAN POTASSIUM AND HYDROCHLOROTHIAZIDE 25; 100 MG/1; MG/1
1 TABLET ORAL DAILY
Qty: 90 TABLET | Refills: 0 | Status: SHIPPED | OUTPATIENT
Start: 2024-01-08 | End: 2024-04-08

## 2024-01-08 RX ORDER — ACETAMINOPHEN 500 MG
500 TABLET ORAL EVERY 6 HOURS PRN
Qty: 180 TABLET | Refills: 1 | Status: SHIPPED | OUTPATIENT
Start: 2024-01-08

## 2024-01-08 RX ORDER — IBUPROFEN 400 MG/1
400 TABLET, FILM COATED ORAL EVERY 6 HOURS PRN
Qty: 180 TABLET | Refills: 1 | Status: SHIPPED | OUTPATIENT
Start: 2024-01-08

## 2024-01-08 RX ORDER — ALBUTEROL SULFATE 90 UG/1
2 AEROSOL, METERED RESPIRATORY (INHALATION) EVERY 6 HOURS PRN
Qty: 18 G | Refills: 1 | Status: SHIPPED | OUTPATIENT
Start: 2024-01-08 | End: 2024-07-15

## 2024-01-08 NOTE — PATIENT INSTRUCTIONS
Call 911 anytime you think you may need emergency care. For example, call if:    You have severe trouble breathing.   Call your doctor now or seek immediate medical care if:    You seem to be getting much sicker.     You have new or worse trouble breathing.     You have a new or higher fever.     You have a new rash.   Watch closely for changes in your health, and be sure to contact your doctor if:    You have a new symptom, such as a sore throat, an earache, or sinus pain.     You cough more deeply or more often, especially if you notice more mucus or a change in the color of your mucus.     You do not get better as expected.   Patient Education   Here is the plan from today's visit    1. Acute bacterial sinusitis    - fluticasone (FLONASE) 50 MCG/ACT nasal spray; Spray 1 spray into both nostrils daily  Dispense: 18.2 mL; Refill: 3  - amoxicillin-clavulanate (AUGMENTIN) 875-125 MG tablet; Take 1 tablet by mouth 2 times daily  Dispense: 10 tablet; Refill: 0    2. Acute cough    - Basic metabolic panel; Future  - CBC with platelets differential; Future  - Symptomatic Influenza A/B, RSV, & SARS-CoV2 PCR (COVID-19) Nose  - fluticasone (FLONASE) 50 MCG/ACT nasal spray; Spray 1 spray into both nostrils daily  Dispense: 18.2 mL; Refill: 3  - benzonatate (TESSALON) 100 MG capsule; Take 1 capsule (100 mg) by mouth 3 times daily as needed for cough  Dispense: 60 capsule; Refill: 0  - acetaminophen (TYLENOL) 500 MG tablet; Take 1 tablet (500 mg) by mouth every 6 hours as needed for fever or pain  Dispense: 180 tablet; Refill: 1  - ibuprofen (ADVIL/MOTRIN) 400 MG tablet; Take 1 tablet (400 mg) by mouth every 6 hours as needed for moderate pain  Dispense: 180 tablet; Refill: 1    3. Essential hypertension    - losartan-hydrochlorothiazide (HYZAAR) 100-25 MG tablet; Take 1 tablet by mouth daily  Dispense: 90 tablet; Refill: 0    4. Wheeze    - albuterol (PROAIR HFA/PROVENTIL HFA/VENTOLIN HFA) 108 (90 Base) MCG/ACT inhaler;  Inhale 2 puffs into the lungs every 6 hours as needed for shortness of breath, wheezing or cough  Dispense: 18 g; Refill: 1        Please call or return to clinic if your symptoms don't go away.    Follow up plan  Return in about 2 weeks (around 1/22/2024) for blood pressure follow up and resolution of current symptoms.    Thank you for coming to [facility] today.  Lab Testing:  **If you had lab testing today and your results are reassuring or normal they will be mailed to you or sent through [facility] within 7 days.   **If the lab tests need quick action we will call you with the results.  **If you are having labs done on a different day, please call [phone] to schedule at [facility] or [phone] for other [facility]patient [facility] locations. Labs do not offer walk-in appointments.  The phone number we will call with results is # [phone] (home) . If this is not the best number please call our clinic and change the number.  Medication Refills:  If you need any refills please call your pharmacy and they will contact us.   If you need to  your refill at a new pharmacy, please contact the new pharmacy directly. The new pharmacy will help you get your medications transferred faster.   Scheduling:  If you have any concerns about today's visit or wish to schedule another appointment please call our office during normal business hours [phone] (8-5:00 M-F). If you can no longer make a scheduled visit, please cancel via [facility] or call us to cancel.   If a referral was made to an [facility] specialty provider and you do not get a call from central scheduling, please refer to directions on your visit summary or call our office during normal business hours for assistance.   If a Mammogram was ordered for you at the Breast Center call [phone] to schedule or change your appointment.  If you had an XRay/CT/Ultrasound/MRI ordered the number is [phone] to schedule or change your  radiology appointment.   [facility] has limited ultrasound appointments available on Wednesdays, if you would like your ultrasound at [facility], please call [phone] to schedule.   Medical Concerns:  If you have urgent medical concerns please call [phone] at any time of the day.    Cyndy Espinosa MD

## 2024-01-08 NOTE — PROGRESS NOTES
Assessment & Plan     Acute bacterial sinusitis  Patient has double sickening with sinus congestion that started in December improved and then worsened a few days ago which indicates possible ABRS. Thus, will empirically treat for ABRS and reocmmend flonase in the interim for congestion.  - fluticasone (FLONASE) 50 MCG/ACT nasal spray; Spray 1 spray into both nostrils daily  - amoxicillin-clavulanate (AUGMENTIN) 875-125 MG tablet; Take 1 tablet by mouth 2 times daily    Acute cough  Patient's acute cough is likely a viral cough. Differential includes bronchitis (given duration of >3 weeks of cough, but less likely with only wheezing on lung exam),  CAP (less likely without focal findings on exam, normal CBC and no dyspnea), or other intrinsic lung disease (less likely without history of lung pathology).  - Basic metabolic panel; Future  - CBC with platelets differential; Future  - Symptomatic Influenza A/B, RSV, & SARS-CoV2 PCR (COVID-19) Nose  - fluticasone (FLONASE) 50 MCG/ACT nasal spray; Spray 1 spray into both nostrils daily  - benzonatate (TESSALON) 100 MG capsule; Take 1 capsule (100 mg) by mouth 3 times daily as needed for cough  - acetaminophen (TYLENOL) 500 MG tablet; Take 1 tablet (500 mg) by mouth every 6 hours as needed for fever or pain  - ibuprofen (ADVIL/MOTRIN) 400 MG tablet; Take 1 tablet (400 mg) by mouth every 6 hours as needed for moderate pain  - Basic metabolic panel  - CBC with platelets differential    Essential hypertension  Patient has history of HTN thus will refill blood pressure medication. Patient will likely benefit from up titration of blood pressure medication, but anticipate possibly elevated above goal today due to medical illness.  - losartan-hydrochlorothiazide (HYZAAR) 100-25 MG tablet; Take 1 tablet by mouth daily  - recommend 2 week BP follow up    Wheeze  Patient has slight wheezing on exam indicating possible reactive airway component to current illness versus underlying  asthma. Will provide albuterol today for patient to trial at home and do recommend further workup for asthma in the future if wheezing persists.  - albuterol (PROAIR HFA/PROVENTIL HFA/VENTOLIN HFA) 108 (90 Base) MCG/ACT inhaler; Inhale 2 puffs into the lungs every 6 hours as needed for shortness of breath, wheezing or cough    Return in about 2 weeks (around 1/22/2024) for blood pressure follow up and resolution of current symptoms.    Cyndy Espinosa MD  [facility] NASIR Mak is a 58 year old, presenting for the following health issues:  Clinic Care Coordination - Initial (Pt here for cough, body ache, headache)      1/8/2024     8:50 AM   Additional Questions   Roomed by ua   Accompanied by          1/8/2024     8:50 AM   Patient Reported Additional Medications   Patient reports taking the following new medications n/a       HPI     Initially became sick at beginning of December recovered for ~4 days then became sick again since this past Friday (1/5/23)  Did COVID test it was negative  Has myalgias, chest congestion, headache, cough, sinus pressure,   No sick contacts   No travel  No sputum production  No shortness of breath  Ribs hurt from coughing  Blood pressures are usually 150s at home  Using tylenol, robitussin, and Corcidin (OTC with antihistamine and tylenol)  Has been getting sick frequently recnetly (was sick in December)  Hedache and cough are most worriseome to patient; photophobia is also present;  Has noted wheezing        Objective    BP (!) 157/113 (BP Location: Left arm, Patient Position: Sitting, Cuff Size: Adult Large)   Pulse 56   Temp 98.1  F (36.7  C) (Oral)   Resp 16   SpO2 98%   There is no height or weight on file to calculate BMI.  Physical Exam   GENERAL: healthy, alert and no distress  NECK: no adenopathy, no asymmetry, masses, or scars and thyroid normal to palpation  HEENT: normal TMS, nares boggy erythematous, oropharynx without exudate or  erythema  RESP: no rales , no rhonchi, expiratory wheezes bilateral,  CV: regular rate and rhythm, normal S1 S2, no S3 or S4, no peripheral edema and peripheral pulses strong  ABDOMEN: soft, nontender, no hepatosplenomegaly, no masses and bowel sounds normal  MS: no gross musculoskeletal defects noted, no edema

## 2024-01-08 NOTE — LETTER
[facility]  [address] [place]  [address]  [facility] [place] [facility] [address]  [phone]    2024    Re: Aubree Yao  [address] [address]  [phone] (home)     : 1965      To Whom It May Concern:      Aubree Yao was seen in clinic on 24 due to medical illness.  She may return to work on 1/15/24 after recovery from medical illness..        Sincerely,        Cyndy Espinosa MD

## 2024-01-11 NOTE — PROGRESS NOTES
Preceptor Attestation:   Patient seen, evaluated and discussed with the resident. I have verified the content of the note, which accurately reflects my assessment of the patient and the plan of care.   Supervising Physician:  Justice Neil MD

## 2024-01-15 ENCOUNTER — TELEPHONE (OUTPATIENT)
Dept: FAMILY MEDICINE | Facility: CLINIC | Age: 59
End: 2024-01-15
Payer: COMMERCIAL

## 2024-01-15 NOTE — TELEPHONE ENCOUNTER
[facility] phone call message-patient reporting a symptom:     Symptom: Still has symptoms after their visit. Cough and chills    Same Day Visit Offered: Yes, declined    Additional comments: They are still sick and they are saying their medication is not working.  They are wondering if they can get a new medication or higher dose. They also want an extension for work.     OK to leave message on voice mail? Yes    Primary language: English      needed? No    Call taken on January 15, 2024 at 8:20 AM by Hemant Pena

## 2024-01-15 NOTE — TELEPHONE ENCOUNTER
"RN called and spoke with pt who reports that she is feeling a \"little better\" but not as well as she would like to feel. RN gave provider recommendation and attempted to get pt scheduled for [facility] slot but pt declined. She reports that she has not really been taking the benzonatate (TESSALON) 100 MG capsule but has noted that \"it helps\" but she was waiting until she coughed before she took it. RN educated pt on taking it 3x day PRN. Pt plans on taking it for the next couple days and reports she has not been using the albuterol (PROAIR HFA/PROVENTIL HFA/VENTOLIN HFA) 108 (90 Base) MCG/ACT inhaler either. RN again educated pt on using the inhaler and pt stated she will start using that as well. RN advised pt if she is not getting better after a couple days of using both medications to call us back to get scheduled for an appt, pt verbalized understanding.     Dia Briggs RN  "

## 2024-01-17 NOTE — TELEPHONE ENCOUNTER
RN called to check on pt. Pt stated she is feeling about the same but a little better. RN attempted to get pt scheduled in a [facility] slot but pt still declines and wants to give it some more time. RN educated pt on red flag symptoms and when to all clinic back or got to UC/ED. Pt verbalized understanding.    Dia Briggs RN   34.3

## 2024-04-08 DIAGNOSIS — I10 ESSENTIAL HYPERTENSION: ICD-10-CM

## 2024-04-08 RX ORDER — LOSARTAN POTASSIUM AND HYDROCHLOROTHIAZIDE 25; 100 MG/1; MG/1
1 TABLET ORAL DAILY
Qty: 90 TABLET | Refills: 0 | Status: SHIPPED | OUTPATIENT
Start: 2024-04-08 | End: 2024-07-15

## 2024-04-08 NOTE — TELEPHONE ENCOUNTER
"Request for medication refill:  osartan-hydrochlorothiazide (HYZAAR) 100-25 MG tablet   Providers if patient needs an appointment and you are willing to give a one month supply please refill for one month and  send a letter/MyChart using \".SMILLIMITEDREFILL\" .smillimited and route chart to \"P SMI \" (Giving one month refill in non controlled medications is strongly recommended before denial)    If refill has been denied, meaning absolutely no refills without visit, please complete the smart phrase \".smirxrefuse\" and route it to the \"P SMI MED REFILLS\"  pool to inform the patient and the pharmacy.    Ruth Gar MA     " Informed Harrison, pt's father, of EEG results. Verbalized understanding.

## 2024-04-19 ENCOUNTER — MYC REFILL (OUTPATIENT)
Dept: FAMILY MEDICINE | Facility: CLINIC | Age: 59
End: 2024-04-19

## 2024-04-19 DIAGNOSIS — I10 ESSENTIAL HYPERTENSION: ICD-10-CM

## 2024-04-22 RX ORDER — LOSARTAN POTASSIUM AND HYDROCHLOROTHIAZIDE 25; 100 MG/1; MG/1
1 TABLET ORAL DAILY
Qty: 90 TABLET | Refills: 0 | OUTPATIENT
Start: 2024-04-22

## 2024-04-22 NOTE — TELEPHONE ENCOUNTER
Duplicate request for losartan-hydrochlorothiazide (HYZAAR) 100-25 MG tablet script sent 4/8/24 for 90 tablets.    Dia Briggs RN

## 2024-07-15 ENCOUNTER — OFFICE VISIT (OUTPATIENT)
Dept: FAMILY MEDICINE | Facility: CLINIC | Age: 59
End: 2024-07-15
Payer: COMMERCIAL

## 2024-07-15 VITALS
HEART RATE: 91 BPM | SYSTOLIC BLOOD PRESSURE: 171 MMHG | RESPIRATION RATE: 18 BRPM | WEIGHT: 213 LBS | TEMPERATURE: 98 F | BODY MASS INDEX: 33.43 KG/M2 | OXYGEN SATURATION: 99 % | HEIGHT: 67 IN | DIASTOLIC BLOOD PRESSURE: 116 MMHG

## 2024-07-15 DIAGNOSIS — R63.5 WEIGHT GAIN: ICD-10-CM

## 2024-07-15 DIAGNOSIS — R23.2 VASOMOTOR FLUSHING: ICD-10-CM

## 2024-07-15 DIAGNOSIS — Z12.4 CERVICAL CANCER SCREENING: ICD-10-CM

## 2024-07-15 DIAGNOSIS — Z00.00 ENCOUNTER FOR WELLNESS EXAMINATION IN ADULT: Primary | ICD-10-CM

## 2024-07-15 DIAGNOSIS — Z78.0 MENOPAUSE: ICD-10-CM

## 2024-07-15 DIAGNOSIS — E78.5 HYPERLIPIDEMIA LDL GOAL <70: ICD-10-CM

## 2024-07-15 DIAGNOSIS — I10 ESSENTIAL HYPERTENSION: ICD-10-CM

## 2024-07-15 LAB
CHOLEST SERPL-MCNC: 156 MG/DL
FASTING STATUS PATIENT QL REPORTED: ABNORMAL
HBA1C MFR BLD: 5.6 % (ref 0–5.6)
HDLC SERPL-MCNC: 42 MG/DL
LDLC SERPL CALC-MCNC: 101 MG/DL
NONHDLC SERPL-MCNC: 114 MG/DL
TRIGL SERPL-MCNC: 65 MG/DL
TSH SERPL DL<=0.005 MIU/L-ACNC: 0.78 UIU/ML (ref 0.3–4.2)

## 2024-07-15 PROCEDURE — 99396 PREV VISIT EST AGE 40-64: CPT | Performed by: FAMILY MEDICINE

## 2024-07-15 PROCEDURE — 99214 OFFICE O/P EST MOD 30 MIN: CPT | Mod: 25 | Performed by: FAMILY MEDICINE

## 2024-07-15 PROCEDURE — 83036 HEMOGLOBIN GLYCOSYLATED A1C: CPT | Performed by: FAMILY MEDICINE

## 2024-07-15 PROCEDURE — 84443 ASSAY THYROID STIM HORMONE: CPT | Performed by: FAMILY MEDICINE

## 2024-07-15 PROCEDURE — 88175 CYTOPATH C/V AUTO FLUID REDO: CPT | Performed by: FAMILY MEDICINE

## 2024-07-15 PROCEDURE — 80061 LIPID PANEL: CPT | Performed by: FAMILY MEDICINE

## 2024-07-15 PROCEDURE — 87624 HPV HI-RISK TYP POOLED RSLT: CPT | Performed by: FAMILY MEDICINE

## 2024-07-15 PROCEDURE — 36415 COLL VENOUS BLD VENIPUNCTURE: CPT | Performed by: FAMILY MEDICINE

## 2024-07-15 RX ORDER — LOSARTAN POTASSIUM AND HYDROCHLOROTHIAZIDE 25; 100 MG/1; MG/1
TABLET ORAL
Qty: 135 TABLET | Refills: 1 | Status: SHIPPED | OUTPATIENT
Start: 2024-07-15

## 2024-07-15 SDOH — HEALTH STABILITY: PHYSICAL HEALTH: ON AVERAGE, HOW MANY DAYS PER WEEK DO YOU ENGAGE IN MODERATE TO STRENUOUS EXERCISE (LIKE A BRISK WALK)?: 3 DAYS

## 2024-07-15 ASSESSMENT — SOCIAL DETERMINANTS OF HEALTH (SDOH): HOW OFTEN DO YOU GET TOGETHER WITH FRIENDS OR RELATIVES?: MORE THAN THREE TIMES A WEEK

## 2024-07-15 NOTE — PROGRESS NOTES
"Preventive Care Visit  [facility] NASIR Velazquez DO, Family Medicine  Jul 15, 2024      Assessment & Plan     Encounter for wellness examination in adult  Cervical cancer screening  Pap smear today. Due for a colonoscopy in 2026. Reassured patient that she can continue to take a multivitamin.  - Pap Screen with HPV - Recommended Age 30 - 65 Years    Essential hypertension  Uncontrolled hypertension, with BP today 171/116. Discussed that we want her diastolic blood pressure to be under 100. Recommended increasing dose of blood pressure medication to 1.5 pills daily. Will plan to recheck blood pressure in 3 months.  - losartan-hydrochlorothiazide (HYZAAR) 100-25 MG tablet  Dispense: 135 tablet; Refill: 1  - Hemoglobin A1c  - TSH with free T4 reflex    Hyperlipidemia LDL goal <70  History of hyperlipidemia, will recheck lipid panel today.  - Lipid panel reflex to direct LDL Non-fasting    Menopause  Vasomotor flushing  Reviewed that menopause is defined by 12 months of amenorrhea. Discussed that the length of symptoms varies. Reviewed that there are treatment options if hot flashes persist or become more bothersome. For excessive sweating, recommended using Gold Bond powder.    Weight gain  Discussed that there are many causes of weight gain. Will check basic labs today (lipid panel, TSH, A1C). Discussed the mechanism of OTC meds for weight loss. Discussed option to pursue weight management center referral at [facility], patient declines at this time because she has access to a dietician through her work. Plan to continue to manage weight through lifestyle modifications at this time.    BMI  Estimated body mass index is 33.86 kg/m  as calculated from the following:    Height as of this encounter: 1.689 m (5' 6.5\").    Weight as of this encounter: 96.6 kg (213 lb).   Weight management plan: Discussed healthy diet and exercise guidelines    Counseling  Appropriate preventive services were discussed " with this patient, including applicable screening as appropriate for nutrition, physical activity, weight loss.  Checklist reviewing preventive services available has been given to the patient.  Reviewed patient's diet, addressing concerns and/or questions.   She is at risk for lack of exercise and has been provided with information to increase physical activity for the benefit of her well-being.   She is at risk for psychosocial distress and has been provided with information to reduce risk.     Return in about 3 months (around 10/15/2024) for Follow up HTNBijal Mak is a 58 year old, presenting for the following:  Physical        7/15/2024     7:59 AM   Additional Questions   Roomed by richard   Accompanied by self         7/15/2024     7:59 AM   Patient Reported Additional Medications   Patient reports taking the following new medications none         7/15/2024    Information    services provided? No           Health Care Directive  Patient does not have a Health Care Directive or Living Will: not discussed    HPI    Weight  - Concerns about weight increase.  - Feels that when her weight is better controlled, blood pressure is better controlled.  - Is part of walking group, twice per week (45 min-1 hour).  - Has a dog, likes to take for walks 3x per week.  - Finds eating habits are worse when she's around her adult children.  - Is not interested in newer medications for weight loss.  - Doesn't want anything that will interact with her BP medication.  - Wants to be more mindful during family gatherings.  - Wants to talk about Orlistat or Denilson    Colon screening  - Wants to discuss when she is due next for screening.    Vitamins/supplements  - Taking fish oil, women's vitamin, magnesium, wants to know if should continue.    Menopause  - Gets really hot at night for the past year, some associated itching with excessive sweating.  - No period since last summer, year before was  inconsistent, every 2 months.        7/15/2024   General Health   How would you rate your overall physical health? Good   Feel stress (tense, anxious, or unable to sleep) Only a little      (!) STRESS CONCERN      7/15/2024   Nutrition   Three or more servings of calcium each day? Yes   Diet: Regular (no restrictions)   How many servings of fruit and vegetables per day? (!) 2-3   How many sweetened beverages each day? 0-1            7/15/2024   Exercise   Days per week of moderate/strenous exercise 3 days            7/15/2024   Social Factors   Frequency of gathering with friends or relatives More than three times a week   Worry food won't last until get money to buy more No   Food not last or not have enough money for food? No   Do you have housing? (Housing is defined as stable permanent housing and does not include staying ouside in a car, in a tent, in an abandoned building, in an overnight shelter, or couch-surfing.) Yes   Are you worried about losing your housing? No   Lack of transportation? No   Unable to get utilities (heat,electricity)? No            7/15/2024   Fall Risk   Fallen 2 or more times in the past year? No   Trouble with walking or balance? No             7/15/2024   Dental   Dentist two times every year? Yes            7/15/2024   TB Screening   Were you born outside of the [place]? No            Today's PHQ-2 Score:       7/15/2024     7:54 AM   PHQ-2 ( 1999 [employer])   Q1: Little interest or pleasure in doing things 0   Q2: Feeling down, depressed or hopeless 0   PHQ-2 Score 0   Q1: Little interest or pleasure in doing things Not at all   Q2: Feeling down, depressed or hopeless Not at all   PHQ-2 Score 0           7/15/2024   Substance Use   Alcohol more than 3/day or more than 7/wk Not Applicable   Do you use any other substances recreationally? No        Social History     Tobacco Use    Smoking status: Never    Smokeless tobacco: Never   Substance Use Topics    Alcohol use: No    Drug use: No  "          9/7/2023   LAST FHS-7 RESULTS   1st degree relative breast or ovarian cancer No   Any relative bilateral breast cancer No   Any male have breast cancer No   Any ONE woman have BOTH breast AND ovarian cancer No   Any woman with breast cancer before 50yrs No   2 or more relatives with breast AND/OR ovarian cancer No   2 or more relatives with breast AND/OR bowel cancer No      Mammogram due fall 2024.        7/15/2024   STI Screening   New sexual partner(s) since last STI/HIV test? No        History of abnormal Pap smear: No - age 30- 64 PAP with HPV every 5 years recommended        Latest Ref Rng & Units 10/31/2019     3:33 PM 10/31/2019     2:22 PM 9/11/2014    12:00 AM   PAP / HPV   PAP (Historical)   NIL  NIL    HPV 16 DNA NEG^Negative Negative      HPV 18 DNA NEG^Negative Negative      Other HR HPV NEG^Negative Negative        ASCVD Risk   The 10-year ASCVD risk score (Darya SKINNER, et al., 2019) is: 19.1%    Values used to calculate the score:      Age: 58 years      Sex: Female      Is Non- : Yes      Diabetic: No      Tobacco smoker: No      Systolic Blood Pressure: 171 mmHg      Is BP treated: Yes      HDL Cholesterol: 35 mg/dL      Total Cholesterol: 194 mg/dL    Reviewed and updated as needed this visit by Provider                    Objective    Exam  BP (!) 171/116 (BP Location: Left arm, Patient Position: Sitting, Cuff Size: Adult Large)   Pulse 91   Temp 98  F (36.7  C)   Resp 18   Ht 1.689 m (5' 6.5\")   Wt 96.6 kg (213 lb)   LMP 04/22/2021 (Approximate)   SpO2 99%   BMI 33.86 kg/m     Estimated body mass index is 33.86 kg/m  as calculated from the following:    Height as of this encounter: 1.689 m (5' 6.5\").    Weight as of this encounter: 96.6 kg (213 lb).    Physical Exam  GENERAL: alert and no distress  EYES: Eyes grossly normal to inspection, PERRL and conjunctivae and sclerae normal  HENT: ear canals and TM's normal, nose and mouth without ulcers or " lesions  NECK: no adenopathy, no asymmetry, masses, or scars  RESP: lungs clear to auscultation - no rales, rhonchi or wheezes  CV: regular rate and rhythm, normal S1 S2, no S3 or S4, no murmur, click or rub, no peripheral edema  ABDOMEN: soft, nontender, no hepatosplenomegaly, no masses and bowel sounds normal   (female): normal female external genitalia, normal urethral meatus, normal vaginal mucosa, and normal cervix/adnexa/uterus without masses or discharge  MS: no gross musculoskeletal defects noted, no edema  SKIN: no suspicious lesions or rashes  NEURO: Normal strength and tone, mentation intact and speech normal  PSYCH: mentation appears normal, affect normal/bright    I, Gabrielle Robles MS3, saw and examined the patient in the presence of Dr. Velazquez.    Signed Electronically by: Cathy Velazquez DO

## 2024-07-16 LAB
HPV HR 12 DNA CVX QL NAA+PROBE: NEGATIVE
HPV16 DNA CVX QL NAA+PROBE: NEGATIVE
HPV18 DNA CVX QL NAA+PROBE: NEGATIVE
HUMAN PAPILLOMA VIRUS FINAL DIAGNOSIS: NORMAL

## 2024-07-16 RX ORDER — LOSARTAN POTASSIUM AND HYDROCHLOROTHIAZIDE 25; 100 MG/1; MG/1
1 TABLET ORAL DAILY
Qty: 90 TABLET | Refills: 0 | OUTPATIENT
Start: 2024-07-16

## 2024-07-16 NOTE — TELEPHONE ENCOUNTER
Duplicate request for losartan-hydrochlorothiazide (HYZAAR) 100-25 MG tablet script sent 7/15/24    Dia Briggs RN

## 2024-07-18 LAB
BKR LAB AP GYN ADEQUACY: NORMAL
BKR LAB AP GYN INTERPRETATION: NORMAL
BKR LAB AP PREVIOUS ABNORMAL: NORMAL
PATH REPORT.COMMENTS IMP SPEC: NORMAL
PATH REPORT.COMMENTS IMP SPEC: NORMAL
PATH REPORT.RELEVANT HX SPEC: NORMAL

## 2024-07-29 ENCOUNTER — TELEPHONE (OUTPATIENT)
Dept: FAMILY MEDICINE | Facility: CLINIC | Age: 59
End: 2024-07-29
Payer: COMMERCIAL

## 2024-07-29 ENCOUNTER — MYC REFILL (OUTPATIENT)
Dept: FAMILY MEDICINE | Facility: CLINIC | Age: 59
End: 2024-07-29
Payer: COMMERCIAL

## 2024-07-29 DIAGNOSIS — I10 ESSENTIAL HYPERTENSION: ICD-10-CM

## 2024-07-29 RX ORDER — LOSARTAN POTASSIUM AND HYDROCHLOROTHIAZIDE 25; 100 MG/1; MG/1
TABLET ORAL
Qty: 135 TABLET | Refills: 1 | OUTPATIENT
Start: 2024-07-29

## 2024-07-29 NOTE — TELEPHONE ENCOUNTER
[facility] phone call message- medication clarification/question:    Full Medication Name: losartan-hydrochlorothiazide (HYZAAR) 100-25 MG tablet        Question: The pharmacy called to state the insurance is still refusing this medication even though PA states one was not needed. The pharmacy states a dosage exception should be sent and not a PA for this medication.    The pharmacy would like a call back b/c the pharmacy states the patient only have a few days of medication left.     Pharmacy confirmed as      [facility] #[id number] - [address] AT [address] [place] [address]    : Yes    OK to leave a message on voice mail? Yes    Primary language: English      needed? No    Call taken on July 29, 2024 at 9:58 AM by Mia Berger

## 2024-07-30 NOTE — TELEPHONE ENCOUNTER
Per PA from 7/15/24: Insurance states that there are no Qty limits form available. For qty greater than 1 tablet per day, the pharmacy will need to call the pharmacy help desk [phone] for qty override. I informed pharmacy of this and they will call help desk.     RN called and spoke to pharmacy and relayed above message. Pharmacy staff stated they will call and let us know if anything else is needed.    Dia Briggs RN

## 2024-08-29 ENCOUNTER — PATIENT OUTREACH (OUTPATIENT)
Dept: FAMILY MEDICINE | Facility: CLINIC | Age: 59
End: 2024-08-29
Payer: COMMERCIAL

## 2024-08-29 NOTE — TELEPHONE ENCOUNTER
Patient Quality Outreach    Patient is due for the following:   Hypertension -  BP check    Next Steps:   Schedule a office visit for Bp f/u    Type of outreach:    Sent [employer] message.      Questions for provider review:    None           Ellen Perdue, CMA

## 2024-09-11 ENCOUNTER — ANCILLARY PROCEDURE (OUTPATIENT)
Dept: MAMMOGRAPHY | Facility: CLINIC | Age: 59
End: 2024-09-11
Attending: FAMILY MEDICINE
Payer: COMMERCIAL

## 2024-09-11 DIAGNOSIS — Z12.31 VISIT FOR SCREENING MAMMOGRAM: ICD-10-CM

## 2024-09-11 PROCEDURE — 77063 BREAST TOMOSYNTHESIS BI: CPT | Mod: 26 | Performed by: STUDENT IN AN ORGANIZED HEALTH CARE EDUCATION/TRAINING PROGRAM

## 2024-09-11 PROCEDURE — 77063 BREAST TOMOSYNTHESIS BI: CPT

## 2024-09-11 PROCEDURE — 77067 SCR MAMMO BI INCL CAD: CPT

## 2024-09-11 PROCEDURE — 77067 SCR MAMMO BI INCL CAD: CPT | Mod: 26 | Performed by: STUDENT IN AN ORGANIZED HEALTH CARE EDUCATION/TRAINING PROGRAM

## 2024-10-25 ENCOUNTER — IMMUNIZATION (OUTPATIENT)
Dept: FAMILY MEDICINE | Facility: CLINIC | Age: 59
End: 2024-10-25
Payer: COMMERCIAL

## 2024-10-25 DIAGNOSIS — Z23 ENCOUNTER FOR IMMUNIZATION: Primary | ICD-10-CM

## 2024-10-25 PROCEDURE — 99207 PR NO CHARGE NURSE ONLY: CPT

## 2024-10-25 PROCEDURE — 91320 SARSCV2 VAC 30MCG TRS-SUC IM: CPT

## 2024-10-25 PROCEDURE — 90471 IMMUNIZATION ADMIN: CPT

## 2024-10-25 PROCEDURE — 90480 ADMN SARSCOV2 VAC 1/ONLY CMP: CPT

## 2024-10-25 PROCEDURE — 90673 RIV3 VACCINE NO PRESERV IM: CPT

## 2024-10-25 NOTE — PROGRESS NOTES
Prior to immunization administration, verified patients identity using patient s name and date of birth. Please see Immunization Activity for additional information.     Is the patient's temperature normal (100.5 or less)? Yes     Patient MEETS CRITERIA. PROCEED with vaccine administration.      Patient instructed to remain in clinic for 15 minutes afterwards, and to report any adverse reactions.      Link to Ancillary Visit Immunization Standing Orders SmartSet     Screening performed by Pearl Koo MA on 10/25/2024 at 2:49 PM.          Prior to immunization administration, verified patients identity using patient s name and date of birth. Please see Immunization Activity for additional information.     Is the patient's temperature normal (100.5 or less)? Yes     Patient MEETS CRITERIA. PROCEED with vaccine administration.      Patient instructed to remain in clinic for 15 minutes afterwards, and to report any adverse reactions.      Link to Ancillary Visit Immunization Standing Orders SmartSet     Screening performed by Pearl Koo MA on 10/25/2024 at 2:51 PM.

## 2024-11-04 ENCOUNTER — MYC REFILL (OUTPATIENT)
Dept: FAMILY MEDICINE | Facility: CLINIC | Age: 59
End: 2024-11-04
Payer: COMMERCIAL

## 2024-11-04 DIAGNOSIS — I10 ESSENTIAL HYPERTENSION: ICD-10-CM

## 2024-11-04 RX ORDER — LOSARTAN POTASSIUM AND HYDROCHLOROTHIAZIDE 25; 100 MG/1; MG/1
TABLET ORAL
Qty: 135 TABLET | Refills: 3 | Status: SHIPPED | OUTPATIENT
Start: 2024-11-04

## 2024-11-04 NOTE — TELEPHONE ENCOUNTER
"Request for medication refill:  losartan-hydrochlorothiazide (HYZAAR) 100-25 MG tablet     Providers if patient needs an appointment and you are willing to give a one month supply please refill for one month and  send a letter/MyChart using \".SMILLIMITEDREFILL\" .smillimited and route chart to \"P SMI \" (Giving one month refill in non controlled medications is strongly recommended before denial)    If refill has been denied, meaning absolutely no refills without visit, please complete the smart phrase \".smirxrefuse\" and route it to the \"P SMI MED REFILLS\"  pool to inform the patient and the pharmacy.    Ellen Perdue, CMA      "

## 2024-11-05 ENCOUNTER — MYC REFILL (OUTPATIENT)
Dept: FAMILY MEDICINE | Facility: CLINIC | Age: 59
End: 2024-11-05
Payer: COMMERCIAL

## 2024-11-05 DIAGNOSIS — I10 ESSENTIAL HYPERTENSION: ICD-10-CM

## 2024-11-05 RX ORDER — LOSARTAN POTASSIUM AND HYDROCHLOROTHIAZIDE 25; 100 MG/1; MG/1
TABLET ORAL
Qty: 135 TABLET | Refills: 3 | Status: CANCELLED | OUTPATIENT
Start: 2024-11-05

## 2024-11-11 NOTE — TELEPHONE ENCOUNTER
[facility] phone call message- medication clarification/question:    Full Medication Name: losartan-hydrochlorothiazide (HYZAAR)    Dose: 100-25 MG tablet     Question: Patient states that she was told by pharmacy that she needs a prior authorization for this medication and patient is requesting it be done as soon as possible as she is all out of the medication.     Pharmacy confirmed as    [facility] #[id number] - [address] AT [address] [place] [address]  : Yes    OK to leave a message on voice mail? Yes    Primary language: English      needed? No    Call taken on November 7, 2024 at 10:44 AM by Carla Cabrera   
Per Note from 7/18/24 as below:    Prior Authorization Not Needed per Insurance     Medication: LOSARTAN-HYDROCHLOROTHIAZIDE 100-25 MG PO TABS (COMBO)  Insurance Company: [employer] Non-Specialty PA's - Phone [phone] Fax [phone]  Expected CoPay: $    Pharmacy Filling the Rx: [facility][id number] [address] AT [address] [place] [address]  Pharmacy Notified: YES  Insurance states that there are no Qty limits form available. For qty greater than 1 tablet per day, the pharmacy will need to call the pharmacy help desk [phone] for qty override. I informed pharmacy of this and they will call help desk.     Patient Notified: Instructed pharmacy to notify patient once order is ready.   
Clear bilaterally, pupils equal, round and reactive to light.

## 2025-02-17 ENCOUNTER — ALLIED HEALTH/NURSE VISIT (OUTPATIENT)
Dept: FAMILY MEDICINE | Facility: CLINIC | Age: 60
End: 2025-02-17
Payer: COMMERCIAL

## 2025-02-17 DIAGNOSIS — T50.Z95A: Primary | ICD-10-CM

## 2025-02-17 PROCEDURE — 90715 TDAP VACCINE 7 YRS/> IM: CPT

## 2025-02-17 PROCEDURE — 99207 PR NO CHARGE NURSE ONLY: CPT

## 2025-02-17 PROCEDURE — 90472 IMMUNIZATION ADMIN EACH ADD: CPT

## 2025-02-17 PROCEDURE — 90746 HEPB VACCINE 3 DOSE ADULT IM: CPT

## 2025-02-17 PROCEDURE — 90471 IMMUNIZATION ADMIN: CPT

## 2025-02-17 PROCEDURE — 90677 PCV20 VACCINE IM: CPT

## 2025-06-16 ENCOUNTER — PATIENT OUTREACH (OUTPATIENT)
Dept: CARE COORDINATION | Facility: CLINIC | Age: 60
End: 2025-06-16
Payer: COMMERCIAL

## 2025-06-30 ENCOUNTER — PATIENT OUTREACH (OUTPATIENT)
Dept: CARE COORDINATION | Facility: CLINIC | Age: 60
End: 2025-06-30
Payer: COMMERCIAL

## 2025-07-10 ENCOUNTER — TELEPHONE (OUTPATIENT)
Dept: FAMILY MEDICINE | Facility: CLINIC | Age: 60
End: 2025-07-10
Payer: COMMERCIAL

## 2025-07-10 NOTE — TELEPHONE ENCOUNTER
Patient Quality Outreach    Patient is due for the following:   Hypertension -  Hypertension follow-up visit    Action(s) Taken:   Schedule a office visit for BP f/u    Type of outreach:    Sent [employer] message.    Questions for provider review:    None         Ellen Perdue CMA  Chart routed to None.

## 2025-08-15 ENCOUNTER — OFFICE VISIT (OUTPATIENT)
Dept: PODIATRY | Facility: CLINIC | Age: 60
End: 2025-08-15
Payer: COMMERCIAL

## 2025-08-15 ENCOUNTER — ANCILLARY PROCEDURE (OUTPATIENT)
Dept: GENERAL RADIOLOGY | Facility: CLINIC | Age: 60
End: 2025-08-15
Attending: PODIATRIST
Payer: COMMERCIAL

## 2025-08-15 DIAGNOSIS — M25.474 SWELLING OF FIRST METATARSOPHALANGEAL (MTP) JOINT OF RIGHT FOOT: ICD-10-CM

## 2025-08-15 DIAGNOSIS — M10.071 ACUTE IDIOPATHIC GOUT OF RIGHT FOOT: ICD-10-CM

## 2025-08-15 DIAGNOSIS — M79.671 PAIN IN RIGHT FOOT: ICD-10-CM

## 2025-08-15 DIAGNOSIS — M10.071 ACUTE IDIOPATHIC GOUT OF RIGHT FOOT: Primary | ICD-10-CM

## 2025-08-15 LAB — URATE SERPL-MCNC: 7.4 MG/DL (ref 2.4–5.7)

## 2025-08-15 PROCEDURE — 84550 ASSAY OF BLOOD/URIC ACID: CPT | Performed by: PODIATRIST

## 2025-08-15 PROCEDURE — 36415 COLL VENOUS BLD VENIPUNCTURE: CPT | Performed by: PODIATRIST

## 2025-08-15 PROCEDURE — 99203 OFFICE O/P NEW LOW 30 MIN: CPT | Performed by: PODIATRIST

## 2025-08-15 PROCEDURE — 73630 X-RAY EXAM OF FOOT: CPT | Mod: TC | Performed by: RADIOLOGY

## 2025-08-15 RX ORDER — METHYLPREDNISOLONE 4 MG/1
TABLET ORAL
Qty: 21 TABLET | Refills: 0 | Status: SHIPPED | OUTPATIENT
Start: 2025-08-15

## 2025-08-16 ENCOUNTER — HEALTH MAINTENANCE LETTER (OUTPATIENT)
Age: 60
End: 2025-08-16

## 2025-08-25 ENCOUNTER — TELEPHONE (OUTPATIENT)
Dept: PODIATRY | Facility: CLINIC | Age: 60
End: 2025-08-25
Payer: COMMERCIAL

## 2025-08-25 DIAGNOSIS — M79.671 PAIN IN RIGHT FOOT: ICD-10-CM

## 2025-08-25 DIAGNOSIS — M10.071 ACUTE IDIOPATHIC GOUT OF RIGHT FOOT: Primary | ICD-10-CM

## 2025-08-26 ENCOUNTER — PATIENT OUTREACH (OUTPATIENT)
Dept: CARE COORDINATION | Facility: CLINIC | Age: 60
End: 2025-08-26
Payer: COMMERCIAL